# Patient Record
Sex: FEMALE | NOT HISPANIC OR LATINO | Employment: UNEMPLOYED | URBAN - METROPOLITAN AREA
[De-identification: names, ages, dates, MRNs, and addresses within clinical notes are randomized per-mention and may not be internally consistent; named-entity substitution may affect disease eponyms.]

---

## 2017-07-12 ENCOUNTER — NURSE TRIAGE (OUTPATIENT)
Dept: NURSING | Facility: CLINIC | Age: 47
End: 2017-07-12

## 2017-07-12 NOTE — TELEPHONE ENCOUNTER
"  Reason for Disposition    [1] SEVERE pain (e.g., excruciating) AND [2] present > 1 hour    Additional Information    Negative: Shock suspected (e.g., cold/pale/clammy skin, too weak to stand, low BP, rapid pulse)    Negative: Difficult to awaken or acting confused  (e.g., disoriented, slurred speech)    Negative: Passed out (i.e., lost consciousness, collapsed and was not responding)    Negative: Sounds like a life-threatening emergency to the triager    Negative: Chest pain    Pain is mainly in upper abdomen  (if needed ask: \"is it mainly above the belly button?\")    Negative: Severe difficulty breathing (e.g., struggling for each breath, speaks in single words)    Negative: Shock suspected (e.g., cold/pale/clammy skin, too weak to stand, low BP, rapid pulse)    Negative: Difficult to awaken or acting confused  (e.g., disoriented, slurred speech)    Negative: Passed out (i.e., lost consciousness, collapsed and was not responding)    Negative: Visible sweat on face or sweat dripping down face    Negative: Sounds like a life-threatening emergency to the triager    Negative: Followed an abdomen (stomach) injury    Negative: Chest pain    Protocols used: ABDOMINAL PAIN - UPPER-ADULT-, ABDOMINAL PAIN - FEMALE-ADULT-  friend translating/ has upper abdominal pain \"8-10\" for over an hour/ hurts every time she eats/ was seen in [place] and placed on an antibiotic/she took one tablet on 7/10/2017 and later took some mucinex./ has been doing vomiting and had diarrhea/no vomiting today/has only taken one tablet of the antibiotic/ states had X-ray of her stomach in [place] and everything was fine/friend will drive her to the er  Josse Rodriges RN -[phone]  "

## 2018-05-09 ENCOUNTER — THERAPY VISIT (OUTPATIENT)
Dept: PHYSICAL THERAPY | Facility: CLINIC | Age: 48
End: 2018-05-09
Payer: COMMERCIAL

## 2018-05-09 DIAGNOSIS — M25.512 PAIN IN JOINT OF LEFT SHOULDER: ICD-10-CM

## 2018-05-09 DIAGNOSIS — M54.2 CERVICAL SPINE PAIN: Primary | ICD-10-CM

## 2018-05-09 DIAGNOSIS — M54.12 CERVICAL RADICULOPATHY: ICD-10-CM

## 2018-05-09 PROCEDURE — 97535 SELF CARE MNGMENT TRAINING: CPT | Mod: GP | Performed by: PHYSICAL THERAPIST

## 2018-05-09 PROCEDURE — 97162 PT EVAL MOD COMPLEX 30 MIN: CPT | Mod: GP | Performed by: PHYSICAL THERAPIST

## 2018-05-09 NOTE — PROGRESS NOTES
[facility] Initial Evaluation  Subjective:  HPI                    Objective:    Observation:  Patient avoids turning head to L, holds L UE in guarded position (against side, elbow flexed and held against abdomen)   Kyphotic, head forward. She moves in a very guarded manner. Per report she didn't take medication today prior to attending.  System              Cervical/Thoracic Evaluation    AROM:  AROM Cervical:    Flexion:            20%   Extension:       10%  Rotation:         Left: 5%     Right: 20%  Side Bend:      Left: 5%     Right:  15%      Headaches: none  Cervical Myotomes:  Cervical myotomes: R strength testing with some reproduction of L cervical spine/other pain.  L UE testing:  Unable to resist more than slightly due to pain reproduction.      C4 (shrug):  Left: 4    Right: 4  C5 (Deltoid):  Right: 4-  C6 (Biceps):  Right: 4-  C7 (Triceps):  Right: 4-  C8 (Thumb Ext): Left: 5    Right: 5  T1 (Intrinsics): Left: 5    Right: 5  DTR's:  not assessed          Cervical Dermatomes:  not assessed                    Cervical Palpation:  : Hypertonic throughout cervical spine, upper trapezius, rhomboids, L subscapularis,                                                  Positive Spurling's  L   R shoulder motion functional.  L shoulder AROM: Severe shoulder, neck, UT pain attempting to lift arm above chest level, attempting to reach behind back, lift out to side above 45 degrees.  Passive motion not assessed today.  General     ROS    Assessment/Plan:    Patient is a 48 year old female with cervical spine complaints.    Clinical assessment:   Ms. Robison presents with significant cervical spine dysfunction with severe disruption of active/passive movement neck, shoulder, severe radicular pain on L, likely mid cervical spine level.  She also appears to have L shoulder issue, with limited painful passive motion.  Will initiate treatment based upon her tolerance.  She will be further evaluated  this week by neurology.     Patient has the following significant findings with corresponding treatment plan.                Diagnosis 1:  Shoulder pain, cervical spine pain with radicular symptoms L UE  Pain -  hot/cold therapy, manual therapy, self management, education, home program and possibly cervical traction when symptoms subside somewhat  Decreased ROM/flexibility - manual therapy, therapeutic exercise and home program  Decreased strength - therapeutic exercise, therapeutic activities and home program  Inflammation - cold therapy and self management/home program  Impaired muscle performance - neuro re-education and home program  Decreased function - therapeutic activities  Impaired posture - neuro re-education and home program    Therapy Evaluation Codes:   1) History comprised of:   Personal factors that impact the plan of care:      None.    Comorbidity factors that impact the plan of care are:      Overweight.     Medications impacting care: gabapentin.  2) Examination of Body Systems comprised of:   Body structures and functions that impact the plan of care:      Cervical spine, Shoulder and Thoracic Spine.   Activity limitations that impact the plan of care are:      Bathing, Bending, Cooking, Dressing, Grasping, Lifting, Reading/Computer work, Working and Sleeping.  3) Clinical presentation characteristics are:   Evolving/Changing.  4) Decision-Making    Moderate complexity using standardized patient assessment instrument and/or measureable assessment of functional outcome.  Cumulative Therapy Evaluation is: Moderate complexity.    Previous and current functional limitations:  (See Goal Flow Sheet for this information)    Short term and Long term goals: (See Goal Flow Sheet for this information)     Communication ability:  Patient has an  for communication clarity.  Treatment Explanation - The following has been discussed with the patient:   RX ordered/plan of care  Anticipated  outcomes  Possible risks and side effects  This patient would benefit from PT intervention to resume normal activities.   Rehab potential is fair at this time given significant symptoms and very limited tolerance to movement.  She will be seen by neurology this week.    Frequency:  1-2 times per week depending upon tolerance, progress  Duration:  for 4-6 weeks  Discharge Plan:  Achieve all LTG.  Independent in home treatment program.  Reach maximal therapeutic benefit.    Please refer to the daily flowsheet for treatment today, total treatment time and time spent performing 1:1 timed codes.     Physical Therapy Initial Examination/Evaluation  May 9, 2018    Eve Robison is a 48 year old female referred to physical therapy by Stephanie De La Rosa MD for treatment of L rotator cuff syndrome with Precautions/Restrictions/MD instructions to evaluate and treat      Subjective:  DOI/onset: 4-7-18 DOS: none  Acute Injury or Gradual Onset?: unsure but thinks it happened in one day Mechanism of Injury: unknown  Related PMH: has happened before, does report having shrapnel in the upper back Previous Treatment: ER, did have PT Effect of prior treatment: Helpful  Imaging: x-ray, not in record, she is unsure of results Chief Complaint/Functional Limitations:   It is hard to lift her L arm up, lifting arm, showering, all are hard, it is hard to turn her head at all, sleeping is very difficult, and see below in therapy evaluation codes   Pain: rest can't sleep due to pain /10, activity 8 to 10/10 Location: Axillary, upper trap area, L cervical Frequency: Constant Described as: sharp Alleviated by: gabapentin helps, cold helps somewhat Progression of Symptoms: Gradually getting worse, significantly worse than when saw referring MD Time of day when pain is worse: Morning and Evening  Sleeping: Interrupted due to current issue   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

## 2018-05-09 NOTE — MR AVS SNAPSHOT
After Visit Summary   5/9/2018    Eve Robison    MRN: [id number]           Patient Information     Date Of Birth          1970        Visit Information        Provider Department      5/9/2018 7:40 AM Ashlyn Israel, [facility]        Today's Diagnoses     Cervical spine pain    -  1    Cervical radiculopathy        Pain in joint of left shoulder           Follow-ups after your visit        Your next 10 appointments already scheduled     May 10, 2018  7:45 AM CDT   WILLIS Extremity with Ashlyn Israel PT   [facility] ([facility])    [address] [place] [address]   [phone]            May 16, 2018  7:40 AM CDT   WILLIS Extremity with Ashlyn Israel PT   [facility] ([facility])    [facility] [address] [phone]            May 17, 2018 12:10 PM CDT   WILLIS Extremity with Ashlyn Israel PT   [facility] ([facility])    [facility] [address]   [phone]              Who to contact     If you have questions or need follow up information about today's clinic visit or your schedule please contact [facility] [email or web address] [facility] directly at [phone].  Normal or non-critical lab and imaging results will be communicated to you by MyChart, letter or phone within 4 business days after the clinic has received the results. If you do not hear from us within 7 days, please contact the clinic through MyChart or phone. If you have a critical or abnormal lab result, we will notify you by phone as soon as possible.  Submit refill requests through [employer] or call your pharmacy and they will forward the refill request to us. Please allow 3 business days for your refill to be completed.          Additional Information About Your  "Visit        [employer] Information     [employer] lets you send messages to your doctor, view your test results, renew your prescriptions, schedule appointments and more. To sign up, go to [email or web address] . Click on \"Log in\" on the left side of the screen, which will take you to the Welcome page. Then click on \"Sign up Now\" on the right side of the page.     You will be asked to enter the access code listed below, as well as some personal information. Please follow the directions to create your username and password.     Your access code is: [email or web address]  Expires: 2018  5:13 PM     Your access code will  in 90 days. If you need help or a new code, please call your [place] clinic or [phone].        Care EveryWhere ID     This is your Care EveryWhere ID. This could be used by other organizations to access your [place] medical records  [id number]         Blood Pressure from Last 3 Encounters:   06/24/15 108/54   14 106/50   13 107/54    Weight from Last 3 Encounters:   06/24/15 88.5 kg (195 lb)   14 89.4 kg (197 lb)   13 88.5 kg (195 lb)              We Performed the Following     WILLIS Inital Eval Report     PT Eval, Moderate Complexity (38670)     Self Care Management Training        Primary Care Provider Fax #    [facility] [phone]       [address] [place] [address]        Equal Access to Services     OLENA WHITMORE : Hadii aad ku hadasho Soomaali, waaxda luqadaha, qaybta kaalmada adeegyada, cecilio polo . So [facility] [phone].    ATENCIÓN: Si habla español, tiene a cali disposición servicios gratuitos de asistencia lingüística. Llame al [phone].    We comply with applicable federal civil rights laws and [place] laws. We do not discriminate on the basis of race, color, national origin, age, disability, sex, sexual orientation, or gender identity.            Thank you!     Thank you for choosing [facility] [place]  for your care. Our goal is always to provide you with excellent care. Hearing back from our patients is one way we can continue to improve our services. Please take a few minutes to complete the written survey that you may receive in the mail after your visit with us. Thank you!             Your Updated Medication List - Protect others around you: Learn how to safely use, store and throw away your medicines at www.disposemymeds.org.          This list is accurate as of 5/9/18  5:13 PM.  Always use your most recent med list.                   Brand Name Dispense Instructions for use Diagnosis    cyclobenzaprine 10 MG tablet    FLEXERIL    10 tablet    Take 1 tablet by mouth 3 times daily as needed for muscle spasms for 10 days.        gabapentin 300 MG capsule    NEURONTIN    90 capsule    Take 1 capsule by mouth 3 times daily.        ibuprofen 600 MG tablet    ADVIL/MOTRIN    20 tablet    Take 1 tablet (600 mg) by mouth every 8 hours as needed for pain        OMEPRAZOLE PO      Take 20 mg by mouth every morning.        oxyCODONE-acetaminophen 5-325 MG per tablet    PERCOCET    20 tablet    Take 1-2 tablets by mouth every 8 hours as needed for pain.        VITAMIN D (CHOLECALCIFEROL) PO      Take 2,000 Units by mouth daily.

## 2018-05-10 ENCOUNTER — THERAPY VISIT (OUTPATIENT)
Dept: PHYSICAL THERAPY | Facility: CLINIC | Age: 48
End: 2018-05-10
Payer: COMMERCIAL

## 2018-05-10 DIAGNOSIS — M54.12 CERVICAL RADICULOPATHY: ICD-10-CM

## 2018-05-10 DIAGNOSIS — M54.2 CERVICAL SPINE PAIN: ICD-10-CM

## 2018-05-10 DIAGNOSIS — M25.512 PAIN IN JOINT OF LEFT SHOULDER: ICD-10-CM

## 2018-05-10 PROCEDURE — 97140 MANUAL THERAPY 1/> REGIONS: CPT | Mod: GP | Performed by: PHYSICAL THERAPIST

## 2018-05-10 PROCEDURE — 97530 THERAPEUTIC ACTIVITIES: CPT | Mod: GP | Performed by: PHYSICAL THERAPIST

## 2018-05-16 ENCOUNTER — THERAPY VISIT (OUTPATIENT)
Dept: PHYSICAL THERAPY | Facility: CLINIC | Age: 48
End: 2018-05-16
Payer: COMMERCIAL

## 2018-05-16 DIAGNOSIS — M54.12 CERVICAL RADICULOPATHY: ICD-10-CM

## 2018-05-16 DIAGNOSIS — M25.512 PAIN IN JOINT OF LEFT SHOULDER: ICD-10-CM

## 2018-05-16 DIAGNOSIS — M54.2 CERVICAL SPINE PAIN: ICD-10-CM

## 2018-05-16 PROCEDURE — 97140 MANUAL THERAPY 1/> REGIONS: CPT | Mod: GP | Performed by: PHYSICAL THERAPIST

## 2018-05-16 PROCEDURE — 97110 THERAPEUTIC EXERCISES: CPT | Mod: GP | Performed by: PHYSICAL THERAPIST

## 2018-08-23 ENCOUNTER — OFFICE VISIT (OUTPATIENT)
Dept: OBGYN | Facility: CLINIC | Age: 48
End: 2018-08-23
Attending: NURSE PRACTITIONER
Payer: COMMERCIAL

## 2018-08-23 VITALS
WEIGHT: 194.5 LBS | SYSTOLIC BLOOD PRESSURE: 104 MMHG | BODY MASS INDEX: 33.2 KG/M2 | HEIGHT: 64 IN | HEART RATE: 58 BPM | DIASTOLIC BLOOD PRESSURE: 70 MMHG

## 2018-08-23 DIAGNOSIS — Z01.419 ENCOUNTER FOR GYNECOLOGICAL EXAMINATION WITHOUT ABNORMAL FINDING: ICD-10-CM

## 2018-08-23 DIAGNOSIS — N95.0 POST-MENOPAUSE BLEEDING: Primary | ICD-10-CM

## 2018-08-23 DIAGNOSIS — Z12.4 SCREENING FOR MALIGNANT NEOPLASM OF CERVIX: ICD-10-CM

## 2018-08-23 PROCEDURE — G0463 HOSPITAL OUTPT CLINIC VISIT: HCPCS | Mod: 25,ZF

## 2018-08-23 PROCEDURE — 87624 HPV HI-RISK TYP POOLED RSLT: CPT | Performed by: NURSE PRACTITIONER

## 2018-08-23 PROCEDURE — G0476 HPV COMBO ASSAY CA SCREEN: HCPCS | Performed by: NURSE PRACTITIONER

## 2018-08-23 PROCEDURE — G0145 SCR C/V CYTO,THINLAYER,RESCR: HCPCS | Performed by: NURSE PRACTITIONER

## 2018-08-23 ASSESSMENT — PAIN SCALES - GENERAL: PAINLEVEL: SEVERE PAIN (6)

## 2018-08-23 NOTE — PROGRESS NOTES
"SUBJECTIVE:   Eve Robison is a 48 yr old [place] female, , with an actual age 64 who presents to clinic today with post-menopausal bleeding.  Patient declined a professional ; she was accompanied to the visit by her daughter who she requested interpret for this visit.     Eve went through menopause 7 yrs ago. 5 days ago, Eve noticed the onset of light vaginal bleeding; primarily notices the blood when she wipes after voiding, but also has noted some blood on her pad.  Endorses feeling some generalized pelvic pain and bloating for the last 5 days. She is not sexually active. Denies vaginal dryness, itching, and malodor.     Eve is being treated for urinary urgency by her PCP; she denies dysuria and urinary frequency as well as constipation, fever, and diarrhea. She has never been on hormone therapy.     Last pap smear: ~7 yrs ago    Current Outpatient Prescriptions   Medication     Cholecalciferol (VITAMIN D3 PO)     OXYBUTYNIN CHLORIDE PO     No current facility-administered medications for this visit.      Past Medical History:   Diagnosis Date     Left shoulder pain      Past Surgical History:   Procedure Laterality Date     APPENDECTOMY       Family History   Problem Relation Age of Onset     Blood Disease Mother      Leukemia     Hypertension Mother      Blood Disease Daughter      Leukemia     OBJECTIVE:  /70  Pulse 58  Ht 1.626 m (5' 4\")  Wt 88.2 kg (194 lb 8 oz)  BMI 33.39 kg/m2  General: pleasant female in no acute distress  Abdomen: soft, non-tender; no CVA tenderness  Pelvic Exam:  Vulva: no external lesions, normal hair distribution, no adenopathy  Vagina: estrogen-loss atrophy present; thin; pale; no abnormal discharge, minimal rugae, no lesions  Cervix: parous, smooth, pale, no visible lesions; no evidence of a polyp  Uterus: Normal size, non-tender, mobile  Ovaries: not palpable  Rectal exam: normal anus    ASSESSMENT:  Post-menopause bleeding  (primary encounter " diagnosis)  Screening for malignant neoplasm of cervix  Encounter for gynecological examination without abnormal finding      PLAN:   Pap smear done today  No evidence of the etiology of her vaginal bleeding noted on exam.  Patient to schedule pelvic ultrasound to evaluate her uterus. Counseled patient that findings of ultrasound will dictate next steps in evaluation which may include an endometrial biopsy. She was instructed to schedule a follow-up appointment directly following her ultrasound.  Patient expressed understanding and agreement with the plan for care.    Lisa Ruelas, DNP, APRN, WHNP

## 2018-08-23 NOTE — LETTER
"2018       RE: Eve Robison  [address] [address]  [facility] [address]     Dear Colleague,    Thank you for referring your patient, Eve Robison, to the [facility]. Please see a copy of my visit note below.    SUBJECTIVE:   Eve Robison is a 48 yr old [place] female, , with an actual age 64 who presents to clinic today with post-menopausal bleeding.  Patient declined a professional ; she was accompanied to the visit by her daughter who she requested interpret for this visit.     Eve went through menopause 7 yrs ago. 5 days ago, Eve noticed the onset of light vaginal bleeding; primarily notices the blood when she wipes after voiding, but also has noted some blood on her pad.  Endorses feeling some generalized pelvic pain and bloating for the last 5 days. She is not sexually active. Denies vaginal dryness, itching, and malodor.     Eve is being treated for urinary urgency by her PCP; she denies dysuria and urinary frequency as well as constipation, fever, and diarrhea. She has never been on hormone therapy.     Last pap smear: ~7 yrs ago    Current Outpatient Prescriptions   Medication     Cholecalciferol (VITAMIN D3 PO)     OXYBUTYNIN CHLORIDE PO     No current facility-administered medications for this visit.      Past Medical History:   Diagnosis Date     Left shoulder pain      Past Surgical History:   Procedure Laterality Date     APPENDECTOMY       Family History   Problem Relation Age of Onset     Blood Disease Mother      Leukemia     Hypertension Mother      Blood Disease Daughter      Leukemia     OBJECTIVE:  /70  Pulse 58  Ht 1.626 m (5' 4\")  Wt 88.2 kg (194 lb 8 oz)  BMI 33.39 kg/m2  General: pleasant female in no acute distress  Abdomen: soft, non-tender; no CVA tenderness  Pelvic Exam:  Vulva: no external lesions, normal hair distribution, no adenopathy  Vagina: estrogen-loss atrophy present; thin; " pale; no abnormal discharge, minimal rugae, no lesions  Cervix: parous, smooth, pale, no visible lesions; no evidence of a polyp  Uterus: Normal size, non-tender, mobile  Ovaries: not palpable  Rectal exam: normal anus    ASSESSMENT:  Post-menopause bleeding  (primary encounter diagnosis)  Screening for malignant neoplasm of cervix  Encounter for gynecological examination without abnormal finding      PLAN:   Pap smear done today  No evidence of the etiology of her vaginal bleeding noted on exam.  Patient to schedule pelvic ultrasound to evaluate her uterus. Counseled patient that findings of ultrasound will dictate next steps in evaluation which may include an endometrial biopsy. She was instructed to schedule a follow-up appointment directly following her ultrasound.  Patient expressed understanding and agreement with the plan for care.    Lisa Ruelas, ALEX, APRN, WHNP

## 2018-08-23 NOTE — MR AVS SNAPSHOT
After Visit Summary   2018    Eve Robison    MRN: [id number]           Patient Information     Date Of Birth          1970        Visit Information        Provider Department      2018 1:30 PM Lisa Ruelas APRN [facility]        Today's Diagnoses     Post-menopause bleeding    -  1    Screening for malignant neoplasm of cervix        Encounter for gynecological examination without abnormal finding           Follow-ups after your visit        Your next 10 appointments already scheduled     Sep 12, 2018  3:45 PM CDT   Return Visit with Annette Garcia MD   [facility] ([facility])    [place] [facility] [address]  [address]  [facility] [address] [phone]              Who to contact     Please call your clinic at [phone] to:    Ask questions about your health    Make or cancel appointments    Discuss your medicines    Learn about your test results    Speak to your doctor            Additional Information About Your Visit        MyChart Information     [employer] is an electronic gateway that provides easy, online access to your medical records. With [employer], you can request a clinic appointment, read your test results, renew a prescription or communicate with your care team.     To sign up for "[employer]t visit the website at [email or web address]   You will be asked to enter the access code listed below, as well as some personal information. Please follow the directions to create your username and password.     Your access code is: [email or web address]  Expires: 2018  6:30 AM     Your access code will  in 90 days. If you need help or a new code, please contact your [facility] or call [phone] for assistance.        Care EveryWhere ID     This is your Care EveryWhere ID. This could be used by other organizations to access your [facility]  "records  [id number]        Your Vitals Were     Pulse Height BMI (Body Mass Index)             58 1.626 m (5' 4\") 33.39 kg/m2          Blood Pressure from Last 3 Encounters:   08/24/18 103/63   08/23/18 104/70    Weight from Last 3 Encounters:   08/24/18 88 kg (194 lb)   08/23/18 88.2 kg (194 lb 8 oz)              We Performed the Following     HPV High Risk Types DNA Cervical     Pap imaged thin layer screen with HPV - recommended age 30 - 65 years (select HPV order below)     Pap Smear Exam [] Do Not Remove     Pelvic and Breast Exam Procedure []        Primary Care Provider Office Phone # Fax #    Nivia J [facility] [phone]       [place] [address] [facility] [address]        Equal Access to Services     OLENA WHITMORE : Hadii aad ku hadasho Soshelley, waaxda luqadaha, qaybta kaalmada adesteffenyabakari, cecilio polo . So [facility] [phone].    ATENCIÓN: Si habla español, tiene a cali disposición servicios gratuitos de asistencia lingüística. Ambar al [phone].    We comply with applicable federal civil rights laws and [place] laws. We do not discriminate on the basis of race, color, national origin, age, disability, sex, sexual orientation, or gender identity.            Thank you!     Thank you for choosing [facility]  for your care. Our goal is always to provide you with excellent care. Hearing back from our patients is one way we can continue to improve our services. Please take a few minutes to complete the written survey that you may receive in the mail after your visit with us. Thank you!             Your Updated Medication List - Protect others around you: Learn how to safely use, store and throw away your medicines at www.disposemymeds.org.          This list is accurate as of 8/23/18 11:59 PM.  Always use your most recent med list.                   Brand Name Dispense Instructions for use Diagnosis    OXYBUTYNIN " CHLORIDE PO           VITAMIN D3 PO      Take by mouth daily

## 2018-08-23 NOTE — NURSING NOTE
Chief Complaint   Patient presents with     Abnormal Uterine Bleeding     PT c/o vaginal bleeding for 5 days.  Pt's menses stopped 7 yrs ago.

## 2018-08-24 ENCOUNTER — RADIANT APPOINTMENT (OUTPATIENT)
Dept: ULTRASOUND IMAGING | Facility: CLINIC | Age: 48
End: 2018-08-24
Attending: NURSE PRACTITIONER
Payer: COMMERCIAL

## 2018-08-24 ENCOUNTER — OFFICE VISIT (OUTPATIENT)
Dept: OBGYN | Facility: CLINIC | Age: 48
End: 2018-08-24
Attending: NURSE PRACTITIONER
Payer: COMMERCIAL

## 2018-08-24 VITALS
HEART RATE: 71 BPM | HEIGHT: 64 IN | WEIGHT: 194 LBS | SYSTOLIC BLOOD PRESSURE: 103 MMHG | BODY MASS INDEX: 33.12 KG/M2 | DIASTOLIC BLOOD PRESSURE: 63 MMHG

## 2018-08-24 DIAGNOSIS — N95.0 POST-MENOPAUSE BLEEDING: Primary | ICD-10-CM

## 2018-08-24 DIAGNOSIS — N95.0 POST-MENOPAUSE BLEEDING: ICD-10-CM

## 2018-08-24 PROCEDURE — 76856 US EXAM PELVIC COMPLETE: CPT

## 2018-08-24 NOTE — MR AVS SNAPSHOT
"              After Visit Summary   2018    Eve Robison    MRN: [id number]           Patient Information     Date Of Birth          1970        Visit Information        Provider Department      2018 9:00 AM Lisa Ruelas APRN [facility]        Today's Diagnoses     Post-menopause bleeding    -  1       Follow-ups after your visit        Your next 10 appointments already scheduled     Sep 12, 2018  3:45 PM CDT   Return Visit with Annette Garcia MD   [facility] ([facility])    [facility] [address]  [address]  [facility] [address] [phone]              Who to contact     Please call your clinic at [phone] to:    Ask questions about your health    Make or cancel appointments    Discuss your medicines    Learn about your test results    Speak to your doctor            Additional Information About Your Visit        MyChart Information     ffk environmentt is an electronic gateway that provides easy, online access to your medical records. With [email or web address], you can request a clinic appointment, read your test results, renew a prescription or communicate with your care team.     To sign up for ffk environmentt visit the website at [email or web address]   You will be asked to enter the access code listed below, as well as some personal information. Please follow the directions to create your username and password.     Your access code is: [email or web address]  Expires: 2018  6:30 AM     Your access code will  in 90 days. If you need help or a new code, please contact your [facility] or call [phone] for assistance.        Care EveryWhere ID     This is your Care EveryWhere ID. This could be used by other organizations to access your [place] medical records  [id number]        Your Vitals Were     Pulse Height BMI (Body Mass Index)             71 1.626 m (5' 4\") 33.3 " kg/m2          Blood Pressure from Last 3 Encounters:   08/24/18 103/63   08/23/18 104/70    Weight from Last 3 Encounters:   08/24/18 88 kg (194 lb)   08/23/18 88.2 kg (194 lb 8 oz)              Today, you had the following     No orders found for display       Primary Care Provider Office Phone # Fax #    Nivia J Vasquez [phone]       [address] [address]        Equal Access to Services     OLENA WHITMORE : Hadii aad ku hadasho Soomaali, waaxda luqadaha, qaybta kaalmada adeegyada, waxay idiin hayaan adesteffen polo . So [facility] [phone].    ATENCIÓN: Si habla español, tiene a cali disposición servicios gratuitos de asistencia lingüística. Ambar al [phone].    We comply with applicable federal civil rights laws and [place] laws. We do not discriminate on the basis of race, color, national origin, age, disability, sex, sexual orientation, or gender identity.            Thank you!     Thank you for choosing [facility]  for your care. Our goal is always to provide you with excellent care. Hearing back from our patients is one way we can continue to improve our services. Please take a few minutes to complete the written survey that you may receive in the mail after your visit with us. Thank you!             Your Updated Medication List - Protect others around you: Learn how to safely use, store and throw away your medicines at www.disposemymeds.org.          This list is accurate as of 8/24/18 11:59 PM.  Always use your most recent med list.                   Brand Name Dispense Instructions for use Diagnosis    OXYBUTYNIN CHLORIDE PO           VITAMIN D3 PO      Take by mouth daily

## 2018-08-24 NOTE — LETTER
"8/24/2018       RE: Eve Robison  [address] [address]  [facility] [address]     Dear Colleague,    Thank you for referring your patient, Eve Robison, to the [facility] at [facility]. Please see a copy of my visit note below.    SUBJECTIVE:  Eve Robison presents to clinic today for follow-up on her ultrasound done for post-menopausal bleeding.  Eve declined a professional . She was accompanied to the visit by her daughter who acted as an .     Endometrium was 2.9mm; possible endometrial polyp was noted.  Bleeding has slowed. No longer noting any on her pad; notes small amount when she wipes.    Pap smear had been done at last visit. No new symptoms reported today.      OBJECTIVE:  /63  Pulse 71  Ht 1.626 m (5' 4\")  Wt 88 kg (194 lb)  BMI 33.3 kg/m2  General: pleasant female in no acute distress  Psych: normal mentation    ASSESSMENT:  Post-menopause bleeding  (primary encounter diagnosis)    PLAN:  Reviewed ultrasound results with patient. Provided patient with reassurance that the lining of her uterus is very thin.  Offered SIS vs hysteroscopy as next steps in evaluation and management of her bleeding. Counseled patient on each of these procedures, benefits, risks, and how they are done.  Eve desires to have a SIS.  Patient to schedule this procedure at the .  Pap smear result is still pending.   She left stable; expressed understanding and agreement with the plan for care.    Lisa Ruelas, DNP, APRN, WHNP      "

## 2018-08-24 NOTE — PROGRESS NOTES
"SUBJECTIVE:  Eve Robison presents to clinic today for follow-up on her ultrasound done for post-menopausal bleeding.  Eve declined a professional . She was accompanied to the visit by her daughter who acted as an .     Endometrium was 2.9mm; possible endometrial polyp was noted.  Bleeding has slowed. No longer noting any on her pad; notes small amount when she wipes.    Pap smear had been done at last visit. No new symptoms reported today.      OBJECTIVE:  /63  Pulse 71  Ht 1.626 m (5' 4\")  Wt 88 kg (194 lb)  BMI 33.3 kg/m2  General: pleasant female in no acute distress  Psych: normal mentation    ASSESSMENT:  Post-menopause bleeding  (primary encounter diagnosis)    PLAN:  Reviewed ultrasound results with patient. Provided patient with reassurance that the lining of her uterus is very thin.  Offered SIS vs hysteroscopy as next steps in evaluation and management of her bleeding. Counseled patient on each of these procedures, benefits, risks, and how they are done.  Eve desires to have a SIS.  Patient to schedule this procedure at the .  Pap smear result is still pending.   She left stable; expressed understanding and agreement with the plan for care.    Lisa Ruelas, DNP, APRN, WHNP    "

## 2018-08-28 LAB
COPATH REPORT: ABNORMAL
PAP: ABNORMAL

## 2018-08-30 LAB
FINAL DIAGNOSIS: NORMAL
HPV HR 12 DNA CVX QL NAA+PROBE: NEGATIVE
HPV16 DNA SPEC QL NAA+PROBE: NEGATIVE
HPV18 DNA SPEC QL NAA+PROBE: NEGATIVE
SPECIMEN DESCRIPTION: NORMAL
SPECIMEN SOURCE CVX/VAG CYTO: NORMAL

## 2018-09-06 ENCOUNTER — TELEPHONE (OUTPATIENT)
Dept: OBGYN | Facility: CLINIC | Age: 48
End: 2018-09-06

## 2018-09-06 DIAGNOSIS — R87.610 PAPANICOLAOU SMEAR OF CERVIX WITH ATYPICAL SQUAMOUS CELLS OF UNDETERMINED SIGNIFICANCE (ASC-US): ICD-10-CM

## 2018-09-12 ENCOUNTER — OFFICE VISIT (OUTPATIENT)
Dept: OBGYN | Facility: CLINIC | Age: 48
End: 2018-09-12
Payer: COMMERCIAL

## 2018-09-12 ENCOUNTER — RADIANT APPOINTMENT (OUTPATIENT)
Dept: ULTRASOUND IMAGING | Facility: CLINIC | Age: 48
End: 2018-09-12
Payer: COMMERCIAL

## 2018-09-12 VITALS
HEIGHT: 64 IN | BODY MASS INDEX: 33.72 KG/M2 | DIASTOLIC BLOOD PRESSURE: 64 MMHG | SYSTOLIC BLOOD PRESSURE: 98 MMHG | HEART RATE: 66 BPM | WEIGHT: 197.5 LBS

## 2018-09-12 DIAGNOSIS — N95.0 POST-MENOPAUSAL BLEEDING: ICD-10-CM

## 2018-09-12 DIAGNOSIS — N95.0 POSTMENOPAUSAL BLEEDING: Primary | ICD-10-CM

## 2018-09-12 PROCEDURE — 58340 CATHETER FOR HYSTEROGRAPHY: CPT

## 2018-09-12 PROCEDURE — 58340 CATHETER FOR HYSTEROGRAPHY: CPT | Mod: ZF | Performed by: INTERNAL MEDICINE

## 2018-09-12 NOTE — PATIENT INSTRUCTIONS
Return to clinic with any vaginal bleeding, questions, or concerns. Continue routine health maintenance

## 2018-09-12 NOTE — MR AVS SNAPSHOT
"              After Visit Summary   2018    Eve Robison    MRN: [id number]           Patient Information     Date Of Birth          1970        Visit Information        Provider Department      2018 2:00 PM Annette Garcia MD [facility]        Today's Diagnoses     Postmenopausal bleeding    -  1      Care Instructions    Return to clinic with any vaginal bleeding, questions, or concerns. Continue routine health maintenance          Follow-ups after your visit        Follow-up notes from your care team     Return if symptoms worsen or fail to improve.      Who to contact     Please call your clinic at [phone] to:    Ask questions about your health    Make or cancel appointments    Discuss your medicines    Learn about your test results    Speak to your doctor            Additional Information About Your Visit        MyChart Information     SNSplust is an electronic gateway that provides easy, online access to your medical records. With [employer], you can request a clinic appointment, read your test results, renew a prescription or communicate with your care team.     To sign up for SNSplust visit the website at [email or web address]   You will be asked to enter the access code listed below, as well as some personal information. Please follow the directions to create your username and password.     Your access code is: [email or web address]  Expires: 2018  6:30 AM     Your access code will  in 90 days. If you need help or a new code, please contact your [facility] or call [phone] for assistance.        Care EveryWhere ID     This is your Care EveryWhere ID. This could be used by other organizations to access your [place] medical records  [id number]        Your Vitals Were     Pulse Height BMI (Body Mass Index)             66 1.626 m (5' 4\") 33.9 kg/m2          Blood Pressure from Last 3 Encounters:   18 98/64   18 " 103/63   08/23/18 104/70    Weight from Last 3 Encounters:   09/12/18 89.6 kg (197 lb 8 oz)   08/24/18 88 kg (194 lb)   08/23/18 88.2 kg (194 lb 8 oz)              We Performed the Following     Hysterogr/SIS Contrast/Saline Injection (In-Clinic Procedure performed in [place])        Primary Care Provider Office Phone # Fax #    Nivia J [facility] [phone]       [address] [address]        Equal Access to Services     OLENA WHITMORE : Hadii aad ku hadasho Soshelley, waaxda luqadaha, qaybta kaalmada adesteffenyabakari, cecilio polo . So [facility] [phone].    ATENCIÓN: Si habla español, tiene a cali disposición servicios gratuitos de asistencia lingüística. [facility] [phone].    We comply with applicable federal civil rights laws and [place] laws. We do not discriminate on the basis of race, color, national origin, age, disability, sex, sexual orientation, or gender identity.            Thank you!     Thank you for choosing [facility]  for your care. Our goal is always to provide you with excellent care. Hearing back from our patients is one way we can continue to improve our services. Please take a few minutes to complete the written survey that you may receive in the mail after your visit with us. Thank you!             Your Updated Medication List - Protect others around you: Learn how to safely use, store and throw away your medicines at www.disposemymeds.org.          This list is accurate as of 9/12/18 11:59 PM.  Always use your most recent med list.                   Brand Name Dispense Instructions for use Diagnosis    IBUPROFEN PO           OXYBUTYNIN CHLORIDE PO           VITAMIN D3 PO      Take by mouth daily

## 2018-09-12 NOTE — PROGRESS NOTES
"[facility]  SIS Procedure Note      HPI:    Eve Robison is a 48 year old P3 who was recently evaluated for postmenopausal bleeding. She had onset of bleeding at the end of August that was mostly bleeding with wiping and a small amount seen on her menstrual pad. This was her first episode of postmenopausal bleeding. She went through menopause 7 years ago. She was seen by Klaudia Ruelas on 8/23/18. TVUS showed a thin endometrial stripe of 2.9 mm and questionable polyp. She was counseled on her options including saline infused sonohysterography or hysteroscopy. Ms. Robison decided to proceed with SIS. Since her visit, the bleeding has stopped. She is feeling well overall.     Patient declined an  and preferred for her daughter to interpret medical information.     ROS: 10-Point ROS negative except as noted in HPI    Physical Exam  BP 98/64  Pulse 66  Ht 1.626 m (5' 4\")  Wt 89.6 kg (197 lb 8 oz)  BMI 33.9 kg/m2  Body mass index is 33.9 kg/(m^2).  Gen: Well-appearing, NAD  Resp: Breathing comfortably on room air  Pelvic: Cervix visualized without lesions. No blood or discharge in the vagina.     SIS: Prior to procedure, informed consent was obtained. Discussed the risks, benefits, and alternatives, including the low risks of bleeding and infection. Also discussed the possibility of not being able to pass the catheter if the cervix is stenotic.    A sterile speculum was placed in the vagina to visualize the cervix. The cervix was cleaned with betadine x3. The saline catheter was easily passed through the cervix into the endometrial cavity. The speculum was removed and the transvaginal probe was inserted by the ultrasonographer. 3 ml of saline were injected into the cavity under ultrasound visualization. No polyps or fibroids were identified. The ultrasound probe and catheter were removed. The patient tolerated the procedure well. Blood loss 0 mL.    Assessment/Plan:  Eve Robison is a 48 year old " P3 here for SIS for further workup of postmenopausal bleeding. Her bleeding has since resolved. Prior US showed a thin endometrial stripe with questionable polyp. SIS performed today did not show evidence of polyp or fibroid. Discussed with patient the most likely cause of her postmenopausal bleeding is endometrial atrophy with a thin stripe. Encouraged patient to call in or return for evaluation with any additional bleeding, fevers, chills, or vaginal discharge. She should continue her routine health maintenance visits.    Annette Garcia MD  OB/GYN, PGY3  09/12/18    I was present for entire procedure. Agree with above note.   Anusha Ruvalcaba MD

## 2018-10-24 ENCOUNTER — MEDICAL CORRESPONDENCE (OUTPATIENT)
Dept: HEALTH INFORMATION MANAGEMENT | Facility: CLINIC | Age: 48
End: 2018-10-24

## 2019-05-07 ENCOUNTER — TRANSFERRED RECORDS (OUTPATIENT)
Dept: HEALTH INFORMATION MANAGEMENT | Facility: CLINIC | Age: 49
End: 2019-05-07

## 2019-05-10 ENCOUNTER — PRE VISIT (OUTPATIENT)
Dept: UROLOGY | Facility: CLINIC | Age: 49
End: 2019-05-10

## 2019-05-10 NOTE — TELEPHONE ENCOUNTER
MEDICAL RECORDS REQUEST   [facility]  Urology Clinic  [address] [place] [address]  PHONE: [phone]  Fax: [phone]        FUTURE VISIT INFORMATION                                                   Eve Robison, : 1970 scheduled for future visit at [facility]    APPOINTMENT INFORMATION:    Date: 19 10AM    Provider:  Terri Tom MD    Reason for Visit/Diagnosis: Incontinence    REFERRAL INFORMATION:    Referring provider: Nivia Ovalle     Specialty: MD    Referring providers clinic:  [facility] contact number:  [phone]    RECORDS REQUESTED FOR VISIT                                                     NOTES  STATUS/DETAILS   OFFICE NOTE from referring provider  yes   OFFICE NOTE from other specialist  no   DISCHARGE SUMMARY from hospital  no   DISCHARGE REPORT from the ER  no   OPERATIVE REPORT  no   MEDICATION LIST  yes   LABS     URINALYSIS (UA)  Yes      PRE-VISIT CHECKLIST      Record collection complete Yes -  [facility] recs scanned in Epic     Appointment appropriately scheduled           (right time/right provider) Yes   MyChart activation If no, please explain: In process   Questionnaire complete If no, please explain: In process     Completed by: Leta Dickens

## 2019-05-14 ENCOUNTER — PRE VISIT (OUTPATIENT)
Dept: UROLOGY | Facility: CLINIC | Age: 49
End: 2019-05-14

## 2019-05-14 NOTE — TELEPHONE ENCOUNTER
Reason for visit: incontinence consult     Relevant information: referred by [facility]    Records/imaging/labs/orders: records available    Pt called: no need for a call    At Rooming: dip/pvr
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
## 2021-02-26 ENCOUNTER — VIRTUAL VISIT (OUTPATIENT)
Dept: RHEUMATOLOGY | Facility: CLINIC | Age: 51
End: 2021-02-26
Attending: PHYSICIAN ASSISTANT
Payer: COMMERCIAL

## 2021-02-26 DIAGNOSIS — M25.579 PAIN IN JOINT INVOLVING ANKLE AND FOOT, UNSPECIFIED LATERALITY: ICD-10-CM

## 2021-02-26 DIAGNOSIS — M25.50 POLYARTHRALGIA: Primary | ICD-10-CM

## 2021-02-26 PROCEDURE — 99203 OFFICE O/P NEW LOW 30 MIN: CPT | Mod: 95 | Performed by: STUDENT IN AN ORGANIZED HEALTH CARE EDUCATION/TRAINING PROGRAM

## 2021-02-26 RX ORDER — DULOXETIN HYDROCHLORIDE 20 MG/1
20 CAPSULE, DELAYED RELEASE ORAL 2 TIMES DAILY
COMMUNITY

## 2021-02-26 NOTE — PATIENT INSTRUCTIONS
-Please make an appointment for lab and x-rays at any [place] location near you  -I will be in touch once all test results are back  -Follow-up on 3/25 for in-person evaluation        Clinic Address:     [facility] [place]     [address] [place] [address] [phone]    Please enter through West entrance and check in at Check in desk D

## 2021-02-26 NOTE — PROGRESS NOTES
Eve is a 51 year old who is being evaluated via a billable telephone visit.      What phone number would you like to be contacted at? [phone]  How would you like to obtain your AVS? Mail a copy  Phone call duration: 45 minutes
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
## 2021-03-05 ENCOUNTER — HOSPITAL ENCOUNTER (OUTPATIENT)
Dept: GENERAL RADIOLOGY | Facility: CLINIC | Age: 51
End: 2021-03-05
Attending: STUDENT IN AN ORGANIZED HEALTH CARE EDUCATION/TRAINING PROGRAM
Payer: COMMERCIAL

## 2021-03-05 DIAGNOSIS — M25.50 POLYARTHRALGIA: ICD-10-CM

## 2021-03-05 PROCEDURE — 73630 X-RAY EXAM OF FOOT: CPT | Mod: 26 | Performed by: RADIOLOGY

## 2021-03-05 PROCEDURE — 72202 X-RAY EXAM SI JOINTS 3/> VWS: CPT

## 2021-03-05 PROCEDURE — 73130 X-RAY EXAM OF HAND: CPT | Mod: 50

## 2021-03-05 PROCEDURE — 72202 X-RAY EXAM SI JOINTS 3/> VWS: CPT | Mod: 26 | Performed by: RADIOLOGY

## 2021-03-05 PROCEDURE — 73630 X-RAY EXAM OF FOOT: CPT | Mod: 50

## 2021-03-05 PROCEDURE — 73130 X-RAY EXAM OF HAND: CPT | Mod: 26 | Performed by: RADIOLOGY
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
## 2021-03-25 ENCOUNTER — ANCILLARY PROCEDURE (OUTPATIENT)
Dept: GENERAL RADIOLOGY | Facility: CLINIC | Age: 51
End: 2021-03-25
Attending: STUDENT IN AN ORGANIZED HEALTH CARE EDUCATION/TRAINING PROGRAM
Payer: COMMERCIAL

## 2021-03-25 ENCOUNTER — OFFICE VISIT (OUTPATIENT)
Dept: RHEUMATOLOGY | Facility: CLINIC | Age: 51
End: 2021-03-25
Payer: COMMERCIAL

## 2021-03-25 VITALS
OXYGEN SATURATION: 97 % | WEIGHT: 195.5 LBS | SYSTOLIC BLOOD PRESSURE: 90 MMHG | HEART RATE: 69 BPM | BODY MASS INDEX: 33.38 KG/M2 | DIASTOLIC BLOOD PRESSURE: 60 MMHG | TEMPERATURE: 98 F | HEIGHT: 64 IN

## 2021-03-25 DIAGNOSIS — M25.50 POLYARTHRALGIA: ICD-10-CM

## 2021-03-25 DIAGNOSIS — M25.50 POLYARTHRALGIA: Primary | ICD-10-CM

## 2021-03-25 LAB
ALBUMIN SERPL-MCNC: 3.5 G/DL (ref 3.4–5)
ALP SERPL-CCNC: 78 U/L (ref 40–150)
ALT SERPL W P-5'-P-CCNC: 20 U/L (ref 0–50)
ANION GAP SERPL CALCULATED.3IONS-SCNC: 3 MMOL/L (ref 3–14)
AST SERPL W P-5'-P-CCNC: 15 U/L (ref 0–45)
BASOPHILS # BLD AUTO: 0 10E9/L (ref 0–0.2)
BASOPHILS NFR BLD AUTO: 0.4 %
BILIRUB SERPL-MCNC: 0.3 MG/DL (ref 0.2–1.3)
BUN SERPL-MCNC: 13 MG/DL (ref 7–30)
CALCIUM SERPL-MCNC: 9.2 MG/DL (ref 8.5–10.1)
CHLORIDE SERPL-SCNC: 106 MMOL/L (ref 94–109)
CO2 SERPL-SCNC: 32 MMOL/L (ref 20–32)
CREAT SERPL-MCNC: 0.9 MG/DL (ref 0.52–1.04)
DIFFERENTIAL METHOD BLD: ABNORMAL
EOSINOPHIL # BLD AUTO: 0.3 10E9/L (ref 0–0.7)
EOSINOPHIL NFR BLD AUTO: 3.4 %
ERYTHROCYTE [DISTWIDTH] IN BLOOD BY AUTOMATED COUNT: 13.6 % (ref 10–15)
GFR SERPL CREATININE-BSD FRML MDRD: 75 ML/MIN/{1.73_M2}
GLUCOSE SERPL-MCNC: 93 MG/DL (ref 70–99)
HCT VFR BLD AUTO: 38.7 % (ref 35–47)
HGB BLD-MCNC: 12 G/DL (ref 11.7–15.7)
IMM GRANULOCYTES # BLD: 0 10E9/L (ref 0–0.4)
IMM GRANULOCYTES NFR BLD: 0.3 %
LYMPHOCYTES # BLD AUTO: 2.8 10E9/L (ref 0.8–5.3)
LYMPHOCYTES NFR BLD AUTO: 36.9 %
MCH RBC QN AUTO: 25.2 PG (ref 26.5–33)
MCHC RBC AUTO-ENTMCNC: 31 G/DL (ref 31.5–36.5)
MCV RBC AUTO: 81 FL (ref 78–100)
MONOCYTES # BLD AUTO: 0.4 10E9/L (ref 0–1.3)
MONOCYTES NFR BLD AUTO: 5.8 %
NEUTROPHILS # BLD AUTO: 4.1 10E9/L (ref 1.6–8.3)
NEUTROPHILS NFR BLD AUTO: 53.2 %
PLATELET # BLD AUTO: 246 10E9/L (ref 150–450)
POTASSIUM SERPL-SCNC: 3.7 MMOL/L (ref 3.4–5.3)
PROT SERPL-MCNC: 7.6 G/DL (ref 6.8–8.8)
RBC # BLD AUTO: 4.76 10E12/L (ref 3.8–5.2)
SODIUM SERPL-SCNC: 141 MMOL/L (ref 133–144)
TSH SERPL DL<=0.005 MIU/L-ACNC: 1.79 MU/L (ref 0.4–4)
WBC # BLD AUTO: 7.6 10E9/L (ref 4–11)

## 2021-03-25 PROCEDURE — 2894A VOIDCORRECT: CPT | Performed by: STUDENT IN AN ORGANIZED HEALTH CARE EDUCATION/TRAINING PROGRAM

## 2021-03-25 PROCEDURE — 73610 X-RAY EXAM OF ANKLE: CPT | Mod: LT | Performed by: RADIOLOGY

## 2021-03-25 PROCEDURE — 86225 DNA ANTIBODY NATIVE: CPT | Performed by: STUDENT IN AN ORGANIZED HEALTH CARE EDUCATION/TRAINING PROGRAM

## 2021-03-25 PROCEDURE — 86880 COOMBS TEST DIRECT: CPT | Performed by: STUDENT IN AN ORGANIZED HEALTH CARE EDUCATION/TRAINING PROGRAM

## 2021-03-25 PROCEDURE — 99N1036 PR STATISTIC TOTAL PROTEIN: Performed by: STUDENT IN AN ORGANIZED HEALTH CARE EDUCATION/TRAINING PROGRAM

## 2021-03-25 PROCEDURE — 86200 CCP ANTIBODY: CPT | Performed by: STUDENT IN AN ORGANIZED HEALTH CARE EDUCATION/TRAINING PROGRAM

## 2021-03-25 PROCEDURE — 73523 X-RAY EXAM HIPS BI 5/> VIEWS: CPT | Performed by: RADIOLOGY

## 2021-03-25 PROCEDURE — 73564 X-RAY EXAM KNEE 4 OR MORE: CPT | Mod: LT | Performed by: RADIOLOGY

## 2021-03-25 PROCEDURE — 86039 ANTINUCLEAR ANTIBODIES (ANA): CPT | Performed by: STUDENT IN AN ORGANIZED HEALTH CARE EDUCATION/TRAINING PROGRAM

## 2021-03-25 PROCEDURE — 86803 HEPATITIS C AB TEST: CPT | Performed by: STUDENT IN AN ORGANIZED HEALTH CARE EDUCATION/TRAINING PROGRAM

## 2021-03-25 PROCEDURE — 36415 COLL VENOUS BLD VENIPUNCTURE: CPT | Performed by: STUDENT IN AN ORGANIZED HEALTH CARE EDUCATION/TRAINING PROGRAM

## 2021-03-25 PROCEDURE — 86235 NUCLEAR ANTIGEN ANTIBODY: CPT | Performed by: STUDENT IN AN ORGANIZED HEALTH CARE EDUCATION/TRAINING PROGRAM

## 2021-03-25 PROCEDURE — 86431 RHEUMATOID FACTOR QUANT: CPT | Performed by: STUDENT IN AN ORGANIZED HEALTH CARE EDUCATION/TRAINING PROGRAM

## 2021-03-25 PROCEDURE — 80050 GENERAL HEALTH PANEL: CPT | Performed by: STUDENT IN AN ORGANIZED HEALTH CARE EDUCATION/TRAINING PROGRAM

## 2021-03-25 PROCEDURE — 86038 ANTINUCLEAR ANTIBODIES: CPT | Performed by: STUDENT IN AN ORGANIZED HEALTH CARE EDUCATION/TRAINING PROGRAM

## 2021-03-25 PROCEDURE — 84165 PROTEIN E-PHORESIS SERUM: CPT | Performed by: PATHOLOGY

## 2021-03-25 PROCEDURE — 99214 OFFICE O/P EST MOD 30 MIN: CPT | Performed by: STUDENT IN AN ORGANIZED HEALTH CARE EDUCATION/TRAINING PROGRAM

## 2021-03-25 ASSESSMENT — PAIN SCALES - GENERAL: PAINLEVEL: MODERATE PAIN (4)

## 2021-03-25 ASSESSMENT — MIFFLIN-ST. JEOR: SCORE: 1496.78

## 2021-03-25 NOTE — PROGRESS NOTES
"Eve Robison's goals for this visit include:   Chief Complaint   Patient presents with     RECHECK     follow up       She requests these members of her care team be copied on today's visit information: yes    PCP: Nivia Ovalle    Referring Provider:  No referring provider defined for this encounter.    BP 90/60 (BP Location: Left arm, Patient Position: Sitting, Cuff Size: Adult Large)   Pulse 69   Temp 98  F (36.7  C)   Ht 1.626 m (5' 4\")   Wt 88.7 kg (195 lb 8 oz)   LMP 12/05/2012   SpO2 97%   BMI 33.56 kg/m      Do you need any medication refills at today's visit? No  Adonay Wild CMA    "

## 2021-03-25 NOTE — PATIENT INSTRUCTIONS
-please get x-rays and blood tests today  -please follow-up with your primary care doctor regarding lump behind your neck, dizziness and rash as well as nodularity over the back of your neck  -you may need injection of the left hip bursa for pain relief  -I will be in touch once your results are back  -follow-up based on lab results
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
## 2021-03-26 DIAGNOSIS — M25.50 POLYARTHRALGIA: Primary | ICD-10-CM

## 2021-03-26 LAB
ALBUMIN SERPL ELPH-MCNC: 4 G/DL (ref 3.7–5.1)
ALPHA1 GLOB SERPL ELPH-MCNC: 0.3 G/DL (ref 0.2–0.4)
ALPHA2 GLOB SERPL ELPH-MCNC: 0.9 G/DL (ref 0.5–0.9)
ANA PAT SER IF-IMP: ABNORMAL
ANA SER QL IF: POSITIVE
ANA TITR SER IF: ABNORMAL {TITER}
B-GLOBULIN SERPL ELPH-MCNC: 0.9 G/DL (ref 0.6–1)
C3 SERPL-MCNC: NORMAL MG/DL (ref 81–157)
C4 SERPL-MCNC: NORMAL MG/DL (ref 13–39)
CCP AB SER IA-ACNC: 1 U/ML
ENA RNP IGG SER IA-ACNC: <0.2 AI (ref 0–0.9)
ENA SM IGG SER-ACNC: <0.2 AI (ref 0–0.9)
ENA SS-A IGG SER IA-ACNC: <0.2 AI (ref 0–0.9)
ENA SS-B IGG SER IA-ACNC: <0.2 AI (ref 0–0.9)
GAMMA GLOB SERPL ELPH-MCNC: 1 G/DL (ref 0.7–1.6)
HCV AB SERPL QL IA: NONREACTIVE
M PROTEIN SERPL ELPH-MCNC: 0 G/DL
PROT PATTERN SERPL ELPH-IMP: NORMAL
RHEUMATOID FACT SER NEPH-ACNC: <7 IU/ML (ref 0–20)

## 2021-03-27 LAB — DAT POLY-SP REAG RBC QL: NORMAL

## 2021-03-28 LAB — DSDNA AB SER-ACNC: 1 IU/ML

## 2021-03-29 LAB — ENA SCL70 IGG SER IA-ACNC: <0.2 AI (ref 0–0.9)

## 2021-04-02 DIAGNOSIS — M25.50 POLYARTHRALGIA: ICD-10-CM

## 2021-04-02 DIAGNOSIS — M25.579 PAIN IN JOINT INVOLVING ANKLE AND FOOT, UNSPECIFIED LATERALITY: Primary | ICD-10-CM

## 2021-04-02 DIAGNOSIS — R76.8 POSITIVE ANA (ANTINUCLEAR ANTIBODY): ICD-10-CM

## 2021-04-02 NOTE — RESULT ENCOUNTER NOTE
RN, please call the pt/pt's daughter to inform her of lab results which showed +RICKEY 1:160 but negative specific antibodies for lupus, Sjogren's syndrome, mixed connective tissue disease, rheumatoid arthritis. There are a couple of additional tests that I would like to get that I was not able to add on -if she can stop by at some point to get blood test and urine test, that would be great.     X-rays:   Hip x-rays showed mild degenerative findings but no joint fluid. Given negative x-rays, the left hip pain she was experiencing is most likely from trochanteric bursitis as discussed during her visit. I would recommend she sees orthopedics/sports medicine for the L hip pain.   X-rays of ankles showed some degenerative changes but no joint damage or fluid in the joint space.   Her knee x-rays showed osteoarthritis in both knees.     So far, there is no convincing evidence that her pain is from inflammatory arthritis. I would recommend obtaining an MRI of her R foot to follow-up on the findings of prior x-rays. I will place the MRI order.    Thanks!

## 2021-04-05 ENCOUNTER — TELEPHONE (OUTPATIENT)
Dept: RHEUMATOLOGY | Facility: CLINIC | Age: 51
End: 2021-04-05

## 2021-04-05 NOTE — TELEPHONE ENCOUNTER
LVM using interpretive services for pt to call back.       LUDIN Barros, RN   Medical Specialty Care Coordinator   [facility]       ----- Message from Lauren German MD sent at 4/2/2021  5:11 PM CDT -----  RN, please call the pt/pt's daughter to inform her of lab results which showed +RICKEY 1:160 but negative specific antibodies for lupus, Sjogren's syndrome, mixed connective tissue disease, rheumatoid arthritis. There are a couple of additional tests that I would like to get that I was not able to add on -if she can stop by at some point to get blood test and urine test, that would be great.     X-rays:   Hip x-rays showed mild degenerative findings but no joint fluid. Given negative x-rays, the left hip pain she was experiencing is most likely from trochanteric bursitis as discussed during her visit. I would recommend she sees orthopedics/sports medicine for the L hip pain.   X-rays of ankles showed some degenerative changes but no joint damage or fluid in the joint space.   Her knee x-rays showed osteoarthritis in both knees.     So far, there is no convincing evidence that her pain is from inflammatory arthritis. I would recommend obtaining an MRI of her R foot to follow-up on the findings of prior x-rays. I will place the MRI order.    Thanks!

## 2021-04-07 ENCOUNTER — TELEPHONE (OUTPATIENT)
Dept: RHEUMATOLOGY | Facility: CLINIC | Age: 51
End: 2021-04-07

## 2021-04-07 NOTE — TELEPHONE ENCOUNTER
Spoke with patient's daughter and patient below regarding lab results and recommendations. They verbalized understanding. They will contact a [facility] lab close to home to set up an appointment for further blood and urine tests. They were also given imaging results and recommendations. Radiology should contact them to schedule MRI. I told them if they don't hear from anyone by the end of the week to please give our office a call and we can follow up. They are familiar with our clinic number. Patient and daughter were appreciative, they had no further questions at this time.     Patient's daughter would like to be contact to schedule MRI if possible. Her number was updated in the chart, as the one we had listed was old. Correct phone number is [phone].    Mya Du, BSN, RN  Medical Specialty Care Coordinator  [facility]

## 2021-04-07 NOTE — TELEPHONE ENCOUNTER
[facility] Call Center    Phone Message    May a detailed message be left on voicemail: no     Reason for Call: Other: Patient's daughter is calling to get results for xrays and labs. If able to call between 2:00 and 3:00 daughter will be available to interpret.     Action Taken: Message routed to:  Adult Clinics: Rheumatology p [id number]    Travel Screening: Not Applicable

## 2021-04-07 NOTE — TELEPHONE ENCOUNTER
Addressed in additional telephone encounter.     Mya Du, ISABELN, RN  Medical Specialty Care Coordinator  [facility]

## 2021-04-09 DIAGNOSIS — M25.579 PAIN IN JOINT INVOLVING ANKLE AND FOOT, UNSPECIFIED LATERALITY: ICD-10-CM

## 2021-04-09 DIAGNOSIS — R76.8 POSITIVE ANA (ANTINUCLEAR ANTIBODY): ICD-10-CM

## 2021-04-09 DIAGNOSIS — M25.50 POLYARTHRALGIA: ICD-10-CM

## 2021-04-09 LAB
ALBUMIN UR-MCNC: NEGATIVE MG/DL
APPEARANCE UR: CLEAR
BACTERIA #/AREA URNS HPF: ABNORMAL /HPF
BILIRUB UR QL STRIP: NEGATIVE
CK SERPL-CCNC: 140 U/L (ref 30–225)
COLOR UR AUTO: YELLOW
GLUCOSE UR STRIP-MCNC: NEGATIVE MG/DL
HGB UR QL STRIP: ABNORMAL
KETONES UR STRIP-MCNC: NEGATIVE MG/DL
LEUKOCYTE ESTERASE UR QL STRIP: ABNORMAL
NITRATE UR QL: NEGATIVE
NON-SQ EPI CELLS #/AREA URNS LPF: ABNORMAL /LPF
PH UR STRIP: 5.5 PH (ref 5–7)
PROT UR-MCNC: 0.05 G/L
PROT/CREAT 24H UR: 0.05 G/G CR (ref 0–0.2)
RBC #/AREA URNS AUTO: ABNORMAL /HPF
SOURCE: ABNORMAL
SP GR UR STRIP: 1.01 (ref 1–1.03)
UROBILINOGEN UR STRIP-ACNC: 0.2 EU/DL (ref 0.2–1)
WBC #/AREA URNS AUTO: ABNORMAL /HPF

## 2021-04-09 PROCEDURE — 36415 COLL VENOUS BLD VENIPUNCTURE: CPT | Performed by: STUDENT IN AN ORGANIZED HEALTH CARE EDUCATION/TRAINING PROGRAM

## 2021-04-09 PROCEDURE — 82550 ASSAY OF CK (CPK): CPT | Performed by: STUDENT IN AN ORGANIZED HEALTH CARE EDUCATION/TRAINING PROGRAM

## 2021-04-09 PROCEDURE — 84156 ASSAY OF PROTEIN URINE: CPT | Performed by: STUDENT IN AN ORGANIZED HEALTH CARE EDUCATION/TRAINING PROGRAM

## 2021-04-09 PROCEDURE — 86160 COMPLEMENT ANTIGEN: CPT | Performed by: STUDENT IN AN ORGANIZED HEALTH CARE EDUCATION/TRAINING PROGRAM

## 2021-04-09 PROCEDURE — 81001 URINALYSIS AUTO W/SCOPE: CPT | Performed by: STUDENT IN AN ORGANIZED HEALTH CARE EDUCATION/TRAINING PROGRAM

## 2021-04-12 LAB
C3 SERPL-MCNC: 146 MG/DL (ref 81–157)
C4 SERPL-MCNC: 47 MG/DL (ref 13–39)

## 2021-04-12 NOTE — RESULT ENCOUNTER NOTE
Dear Eve,     Your labs showed normal muscle enzyme. I was looking to see if C3 and C4 are low (if low, can be indicative of lupus) -in your case they are not low. Urine without protein but some blood seen without red blood cells. I would recommend follow-up with primary care provider about the blood in the urine. I will wait for the MRI results of the foot.     Please let us know if you have any questions or concerns.    Regards,  Lauren German MD

## 2021-04-14 ENCOUNTER — TELEPHONE (OUTPATIENT)
Dept: RHEUMATOLOGY | Facility: CLINIC | Age: 51
End: 2021-04-14

## 2021-04-14 NOTE — TELEPHONE ENCOUNTER
[facility]    Phone Message    May a detailed message be left on voicemail: yes     Reason for Call: Requesting Results   Name/type of test: lab  Date of test: 4/9/2021  Was test done at a location other than [facility] (Please fill in the location if not [facility])?: No      Action Taken: Message routed to:  Adult Clinics: Rheumatology p [id number]    Travel Screening: Not Applicable

## 2021-04-14 NOTE — TELEPHONE ENCOUNTER
Spoke with patient and her daughter. They did see the result note on MyChart but were unsure how urgent they needed to follow up with PCP regarding blood in the urine. Patient denies any symptoms of a UTI. I did explain that there was only a trace amount seen in her urine. But it's worth mentioning to her PCP for further monitoring and workup if appropriate. They verbalized understanding and were appreciative. I told them that the MRI results will be sent via [employer] as well. No further questions at this time.     Mya Du, BSN, RN  Medical Specialty Care Coordinator  [facility]

## 2021-04-21 ENCOUNTER — E-VISIT (OUTPATIENT)
Dept: URGENT CARE | Facility: URGENT CARE | Age: 51
End: 2021-04-21
Payer: COMMERCIAL

## 2021-04-21 DIAGNOSIS — Z20.822 SUSPECTED COVID-19 VIRUS INFECTION: Primary | ICD-10-CM

## 2021-04-21 DIAGNOSIS — Z20.822 SUSPECTED COVID-19 VIRUS INFECTION: ICD-10-CM

## 2021-04-21 LAB
LABORATORY COMMENT REPORT: NORMAL
SARS-COV-2 RNA RESP QL NAA+PROBE: NEGATIVE
SARS-COV-2 RNA RESP QL NAA+PROBE: NORMAL
SPECIMEN SOURCE: NORMAL
SPECIMEN SOURCE: NORMAL

## 2021-04-21 PROCEDURE — U0003 INFECTIOUS AGENT DETECTION BY NUCLEIC ACID (DNA OR RNA); SEVERE ACUTE RESPIRATORY SYNDROME CORONAVIRUS 2 (SARS-COV-2) (CORONAVIRUS DISEASE [COVID-19]), AMPLIFIED PROBE TECHNIQUE, MAKING USE OF HIGH THROUGHPUT TECHNOLOGIES AS DESCRIBED BY CMS-2020-01-R: HCPCS | Performed by: PHYSICIAN ASSISTANT

## 2021-04-21 PROCEDURE — 99421 OL DIG E/M SVC 5-10 MIN: CPT | Performed by: PHYSICIAN ASSISTANT

## 2021-04-21 PROCEDURE — U0005 INFEC AGEN DETEC AMPLI PROBE: HCPCS | Performed by: PHYSICIAN ASSISTANT

## 2021-04-21 NOTE — PATIENT INSTRUCTIONS
Dear Eve Robison,    Your symptoms show that you may have coronavirus (COVID-19). This illness can cause fever, cough and trouble breathing. Many people get a mild case and get better on their own. Some people can get very sick.    Will I be tested for COVID-19?  We would like to test you for Covid-19 virus. I have placed orders for this test.     To schedule: go to your [employer] home page and scroll down to the section that says  You have an appointment that needs to be scheduled  and click the large green button that says  Schedule Now  and follow the steps to find the next available openings.    If you are unable to complete these [employer] scheduling steps, please call [phone] to schedule your testing.     Return to work/school/ guidance:  Please let your workplace manager and staffing office know when your quarantine ends     We can t give you an exact date as it depends on the above. You can calculate this on your own or work with your manager/staffing office to calculate this. (For example if you were exposed on 10/4, you would have to quarantine for 14 full days. That would be through 10/18. You could return on 10/19.)      If you receive a positive COVID-19 test result, follow the guidance of the those who are giving you the results. Usually the return to work is 10 (or in some cases 20 days from symptom onset.) If you work at [facility], you must also be cleared by Employee Occupational Health and Safety to return to work.        If you receive a negative COVID-19 test result and did not have a high risk exposure to someone with a known positive COVID-19 test, you can return to work once you're free of fever for 24 hours without fever-reducing medication and your symptoms are improving or resolved.      If you receive a negative COVID-19 test and If you had a high risk exposure to someone who has tested positive for COVID-19 then you can return to work 14 days after your last  contact with the positive individual    Note: If you have ongoing exposure to the covid positive person, this quarantine period may be more than 14 days. (For example, if you are continued to be exposed to your child who tested positive and cannot isolate from them, then the quarantine of 7-14 days can't start until your child is no longer contagious. This is typically 10 days from onset of the child's symptoms. So the total duration may be 17-24 days in this case.)    Sign up for [employer].   We know it's scary to hear that you might have COVID-19. We want to track your symptoms to make sure you're okay over the next 2 weeks. Please look for an email from [employer]--this is a free, online program that we'll use to keep in touch. To sign up, follow the link in the email you will receive. Learn more at [email or web address]    How can I take care of myself?    Get lots of rest. Drink extra fluids (unless a doctor has told you not to)    Take Tylenol (acetaminophen) or ibuprofen for fever or pain. If you have liver or kidney problems, ask your family doctor if it's okay to take Tylenol o ibuprofen    If you have other health problems (like cancer, heart failure, an organ transplant or severe kidney disease): Call your specialty clinic if you don't feel better in the next 2 days.    Know when to call 911. Emergency warning signs include:  o Trouble breathing or shortness of breath  o Pain or pressure in the chest that doesn't go away  o Feeling confused like you haven't felt before, or not being able to wake up  o Bluish-colored lips or face    Where can I get more information?  [facility] - About COVID-19:   [email or web address]    CDC - What to Do If You're Sick:   www.cdc.gov/coronavirus/2019-ncov/about/steps-when-sick.html    April 21, 2021  RE:  Eve Robison                                                                                                                   [address]  [id number]  [facility] [address]      To whom it may concern:    I evaluated Eve Robison on April 21, 2021. Eve Robison should be excused from work/school.     They should let their workplace manager and staffing office know when their quarantine ends.    We can not give an exact date as it depends on the information below. They can calculate this on their own or work with their manager/staffing office to calculate this. (For example if they were exposed on 10/04, they would have to quarantine for 14 full days. That would be through 10/18. They could return on 10/19.)    Quarantine Guidelines:      If patient receives a positive COVID-19 test result, they should follow the guidance of those who are giving the results. Usually the return to work is 10 (or in some cases 20 days from symptom onset.) If they work at [employer], they must be cleared by Employee Occupational Health and Safety to return to work.        If patient receives a negative COVID-19 test result and did not have a high risk exposure to someone with a known positive COVID-19 test, they can return to work once they're free of fever for 24 hours without fever-reducing medication and their symptoms are improving or resolved.      If patient receives a negative COVID-19 test and if they had a high risk exposure to someone who has tested positive for COVID-19 then they can return to work 14 days after their last contact with the positive individual    Note: If there is ongoing exposure to the covid positive person, this quarantine period may be longer than 14 days. (For example, if they are continually exposed to their child, who tested positive and cannot isolate from them, then the quarantine of 7-14 days can't start until their child is no longer contagious. This is typically 10 days from onset to the child's symptoms. So the total duration may be 17-24 days in this case.)     Sincerely,  Lenny Lazaro,  DAWSON

## 2021-04-22 ENCOUNTER — TELEPHONE (OUTPATIENT)
Dept: NURSING | Facility: CLINIC | Age: 51
End: 2021-04-22

## 2021-04-22 NOTE — TELEPHONE ENCOUNTER
Informational call:    Daughter is calling about patient. No consent on file. Obtained verbal consent from patient to consent to communicate on file.     Patient is scheduled for second vaccine tomorrow, and is not feeling well. Had negative Covid test yesterday, but is reporting sore throat, headache, runny nose and cough.     Instructed patient to reschedule and call [phone] Covid Scheduling per CDC guidelines to not go to Vaccine center and reschedule when symptoms are resolved.     Instructed patient to stay home and away from others until they have had no fever for 24 hours, symptoms have improved, and at least 10 days have passed since symptoms stated. Patient needs to meet all 3 guidelines per the CDC guidelines.     Roxanne Aquino RN MANate, MAN Nurse Educator 5:35 PM 4/22/2021    COVID 19 Nurse Triage Plan/Patient Instructions    Please be aware that novel coronavirus (COVID-19) may be circulating in the community. If you develop symptoms such as fever, cough, or SOB or if you have concerns about the presence of another infection including coronavirus (COVID-19), please contact your health care provider or visit [email or web address].     Disposition/Instructions    Home care recommended. Follow home care protocol based instructions.    Thank you for taking steps to prevent the spread of this virus.  o Limit your contact with others.  o Wear a simple mask to cover your cough.  o Wash your hands well and often.    Resources    [facility]: About COVID-19: [email or web address]    CDC: What to Do If You're Sick: www.cdc.gov/coronavirus/2019-ncov/about/steps-when-sick.html    CDC: Ending Home Isolation: www.cdc.gov/coronavirus/2019-ncov/hcp/disposition-in-home-patients.html     CDC: Caring for Someone: www.cdc.gov/coronavirus/2019-ncov/if-you-are-sick/care-for-someone.html     [facility]: Interim Guidance for Hospital Discharge to Home:  www.health.[email or web address].[email or web address]/diseases/coronavirus/hcp/hospdischarge.pdf    [facility] clinical trials (COVID-19 research studies): [email or web address]-trials     Below are the COVID-19 hotlines at the [facility] ([facility]). Interpreters are available.   o For health questions: Call [phone] or 1-789.928.3498 (7 a.m. to 7 p.m.)  o For questions about schools and childcare: Call [phone] or 1-455.862.3769 (7 a.m. to 7 p.m.)

## 2021-05-30 ENCOUNTER — HEALTH MAINTENANCE LETTER (OUTPATIENT)
Age: 51
End: 2021-05-30

## 2021-09-19 ENCOUNTER — HEALTH MAINTENANCE LETTER (OUTPATIENT)
Age: 51
End: 2021-09-19

## 2022-06-26 ENCOUNTER — HEALTH MAINTENANCE LETTER (OUTPATIENT)
Age: 52
End: 2022-06-26

## 2022-11-20 ENCOUNTER — HEALTH MAINTENANCE LETTER (OUTPATIENT)
Age: 52
End: 2022-11-20

## 2023-01-28 ENCOUNTER — HOSPITAL ENCOUNTER (EMERGENCY)
Facility: CLINIC | Age: 53
Discharge: HOME | End: 2023-01-29
Attending: EMERGENCY MEDICINE | Admitting: EMERGENCY MEDICINE
Payer: COMMERCIAL

## 2023-01-28 VITALS
RESPIRATION RATE: 16 BRPM | WEIGHT: 180 LBS | TEMPERATURE: 97 F | OXYGEN SATURATION: 100 % | SYSTOLIC BLOOD PRESSURE: 126 MMHG | HEIGHT: 64 IN | HEART RATE: 69 BPM | DIASTOLIC BLOOD PRESSURE: 42 MMHG | BODY MASS INDEX: 30.73 KG/M2

## 2023-01-28 DIAGNOSIS — R09.A2 SENSATION OF FOREIGN BODY IN ESOPHAGUS: ICD-10-CM

## 2023-01-28 PROCEDURE — 99283 EMERGENCY DEPT VISIT LOW MDM: CPT

## 2023-01-29 ENCOUNTER — APPOINTMENT (OUTPATIENT)
Dept: GENERAL RADIOLOGY | Facility: CLINIC | Age: 53
End: 2023-01-29
Attending: EMERGENCY MEDICINE
Payer: COMMERCIAL

## 2023-01-29 PROCEDURE — 70360 X-RAY EXAM OF NECK: CPT

## 2023-01-29 PROCEDURE — 250N000013 HC RX MED GY IP 250 OP 250 PS 637: Performed by: EMERGENCY MEDICINE

## 2023-01-29 RX ORDER — ACETAMINOPHEN 325 MG/1
650 TABLET ORAL ONCE
Status: COMPLETED | OUTPATIENT
Start: 2023-01-29 | End: 2023-01-29

## 2023-01-29 RX ADMIN — ACETAMINOPHEN 650 MG: 325 TABLET, FILM COATED ORAL at 01:02

## 2023-01-29 ASSESSMENT — ENCOUNTER SYMPTOMS
SORE THROAT: 1
SHORTNESS OF BREATH: 0
TROUBLE SWALLOWING: 1

## 2023-01-29 ASSESSMENT — ACTIVITIES OF DAILY LIVING (ADL): ADLS_ACUITY_SCORE: 35

## 2023-01-29 NOTE — ED PROVIDER NOTES
"  History   Chief Complaint:  Swallowed Foreign Body     The history is provided by a relative and the patient. The history is limited by a language barrier. A  was used (Son).      Eve Robison is a 53 year old female who presents after swallowing a foreign body. Patient's son reports that at around 1800 tonight the patient swallowed a small sharp piece of goat bone with her meal. She is experiencing pain in her throat and difficulty swallowing. Patient was able to tolerate bread and tea following the ingestion. No trouble breathing.     Independent Historian:   Son    Review of External Notes:     ROS:  Review of Systems   HENT: Positive for sore throat and trouble swallowing.    Respiratory: Negative for shortness of breath.    All other systems reviewed and are negative.    Allergies:  Asa [Aspirin]  Aspirin  Heparin     Medications:    Benzonatate   Omeprazole   Loratadine  Flonase  Gabapentin  Loperamide  Simethicone   Duloxetine   Naproxen      Past Medical History:     Cervical degenerative disc disease  GERD  Migraine  Vitamin D deficiency   Chronic pain syndrome  PTSD  Pelvic floor dysfunction  Uterine fibroid  Hemorrhoids     Past Surgical History:    Appendectomy      Family History:    Mother - blood cancer    Social History:  Presents with son  PCP: Nivia Ovalle     Physical Exam     Patient Vitals for the past 24 hrs:   BP Temp Temp src Pulse Resp SpO2 Height Weight   01/28/23 2348 -- -- -- -- -- 100 % -- --   01/28/23 2343 126/42 97  F (36.1  C) Temporal 69 16 -- 1.626 m (5' 4\") 81.6 kg (180 lb)        Physical Exam  Physical Exam   General:  Sitting on bed with son at bedside, comfortable appearing.   HENT:  No obvious trauma to head. Posterior oropharynx without erythema, uvula is midline and no soft palate petechiae. No swelling beneath tongue. With tongue depressor, able to visualize epiglottis, no foreign body appreciated, no swelling or erythema to " epiglottis.  Right Ear:  External ear normal.   Left Ear:  External ear normal.   Nose:  Nose normal.   Eyes:  Conjunctivae and EOM are normal.  Neck: Normal range of motion. Neck supple. No tracheal deviation present.   Pulm/Chest: No respiratory distress  M/S: Normal range of motion.   Neuro: Alert. GCS 15.  Skin: Skin is warm and dry. No rash noted. Not diaphoretic.   Psych: Normal mood and affect. Behavior is normal.     Emergency Department Course   Imaging:  Neck soft tissue XR   Preliminary Result   IMPRESSION: No prevertebral edema. Normal appearance of the epiglottis and larynx. No airway compromise. No definite radiopaque foreign body identified. If there is continued clinical concern for a retained foreign body, consider direct inspection.      Multilevel degenerative changes of the cervical spine.        Emergency Department Course & Assessments:     Interventions:  Medications   acetaminophen (TYLENOL) tablet 650 mg (has no administration in time range)      Independent Interpretation (X-rays, CTs, rhythm strip):  None     Social Determinants of Health affecting care:  None    Assessments:  0010 I obtained history and examined the patient, as above.  0023 I rechecked the patient and updated them on plan for xray. Son informed me that patient has shrapnel in the back of her neck, so I adjusted xray order and specified it.  0054 I rechecked and updated patient and son on xray findings. Amenable to discharge.    Disposition:  The patient was discharged to home.     Impression & Plan    Medical Decision Making:  Eve Robison is a very pleasant 53 year old year old patient who presents to the emergency department with concern of pain after swallowing a goat bone with discomfort in the posterior oropharynx/upper neck.  This occurred a few hours ago.  She has since been able to drink water/tea and eat bread without concern.  She denies any aspiration and has not had a cough or shortness of breath.   Physical examination shows nothing in the posterior oropharynx.  With the tongue depressor I can clearly see her epiglottis and appreciate no foreign body in the vallecula nor any erythema or swelling to suggest epiglottitis.  The sensation she has of the foreign body is in the mid to upper neck.  An x-ray was obtained and clearly shows no goat bone.  I discussed the very rare chance that there could be a goat bone not clearly seen on x-ray.  I discussed the risk and benefit of calling and GI to do an emergent EGD at this time.  Given the x-ray was negative, she has been able to eat and drink, I have a much higher suspicion that this represents an esophageal abrasion.  After reviewing this with the patient and son they are in agreement that that is more likely, but nonetheless I did recommend that they return with any persistent or worsening pain or inability to eat food.  I discussed at that time an EGD could then be considered.  I did provide him the phone number for a gastroenterologist to follow-up with tomorrow if the sensation is still present for an outpatient EGD.    The treatment plan was discussed with the patient and they expressed understanding of this plan and consented to the plan.  In addition, the patient will return to the emergency department if their symptoms persist, worsen, if new symptoms arise or if there is any concern as other pathology may be present that is not evident at this time. They also understand the importance of close follow up in the clinic and if unable to do so will return to the emergency department for a reevaluation. All questions were answered.    Diagnosis:    ICD-10-CM    1. Sensation of foreign body in esophagus  R19.8          Scribe Disclosure:  Trudy AGUAYO, am serving as a scribe at 12:05 AM on 1/29/2023 to document services personally performed by Lenny Dixon DO based on my observations and the provider's statements to me.     1/28/2023   Zack  DO Zack Arora, Lenny Goodwin DO  01/29/23 0105

## 2023-01-29 NOTE — ED TRIAGE NOTES
Pt eating dinner tonight, feels like she swallowed a piece of bone that got stuck.  Happened around 6pm.  No respiratory distress.     Triage Assessment     Row Name 01/28/23 7503       Triage Assessment (Adult)    Airway WDL WDL       Respiratory WDL    Respiratory WDL WDL       Skin Circulation/Temperature WDL    Skin Circulation/Temperature WDL WDL       Cardiac WDL    Cardiac WDL WDL       Peripheral/Neurovascular WDL    Peripheral Neurovascular WDL WDL       Cognitive/Neuro/Behavioral WDL    Cognitive/Neuro/Behavioral WDL WDL

## 2023-05-15 ENCOUNTER — HOSPITAL ENCOUNTER (EMERGENCY)
Facility: CLINIC | Age: 53
Discharge: HOME | End: 2023-05-15
Attending: PHYSICIAN ASSISTANT | Admitting: PHYSICIAN ASSISTANT
Payer: COMMERCIAL

## 2023-05-15 VITALS
HEIGHT: 63 IN | SYSTOLIC BLOOD PRESSURE: 120 MMHG | HEART RATE: 96 BPM | TEMPERATURE: 98.9 F | RESPIRATION RATE: 15 BRPM | DIASTOLIC BLOOD PRESSURE: 66 MMHG | WEIGHT: 185 LBS | OXYGEN SATURATION: 98 % | BODY MASS INDEX: 32.78 KG/M2

## 2023-05-15 DIAGNOSIS — R11.10 VOMITING AND DIARRHEA: ICD-10-CM

## 2023-05-15 DIAGNOSIS — A49.1 STREPTOCOCCAL INFECTION: ICD-10-CM

## 2023-05-15 DIAGNOSIS — R19.7 VOMITING AND DIARRHEA: ICD-10-CM

## 2023-05-15 LAB
ANION GAP SERPL CALCULATED.3IONS-SCNC: 15 MMOL/L (ref 7–15)
BASOPHILS # BLD AUTO: 0 10E3/UL (ref 0–0.2)
BASOPHILS NFR BLD AUTO: 0 %
BUN SERPL-MCNC: 12.2 MG/DL (ref 6–20)
CALCIUM SERPL-MCNC: 9.5 MG/DL (ref 8.6–10)
CHLORIDE SERPL-SCNC: 100 MMOL/L (ref 98–107)
CREAT SERPL-MCNC: 0.71 MG/DL (ref 0.51–0.95)
DEPRECATED HCO3 PLAS-SCNC: 25 MMOL/L (ref 22–29)
EOSINOPHIL # BLD AUTO: 0 10E3/UL (ref 0–0.7)
EOSINOPHIL NFR BLD AUTO: 0 %
ERYTHROCYTE [DISTWIDTH] IN BLOOD BY AUTOMATED COUNT: 13.6 % (ref 10–15)
GFR SERPL CREATININE-BSD FRML MDRD: >90 ML/MIN/1.73M2
GLUCOSE SERPL-MCNC: 114 MG/DL (ref 70–99)
HCT VFR BLD AUTO: 42.4 % (ref 35–47)
HGB BLD-MCNC: 13.4 G/DL (ref 11.7–15.7)
IMM GRANULOCYTES # BLD: 0.1 10E3/UL
IMM GRANULOCYTES NFR BLD: 1 %
LYMPHOCYTES # BLD AUTO: 0.5 10E3/UL (ref 0.8–5.3)
LYMPHOCYTES NFR BLD AUTO: 3 %
MCH RBC QN AUTO: 26 PG (ref 26.5–33)
MCHC RBC AUTO-ENTMCNC: 31.6 G/DL (ref 31.5–36.5)
MCV RBC AUTO: 82 FL (ref 78–100)
MONOCYTES # BLD AUTO: 0.9 10E3/UL (ref 0–1.3)
MONOCYTES NFR BLD AUTO: 6 %
NEUTROPHILS # BLD AUTO: 13.8 10E3/UL (ref 1.6–8.3)
NEUTROPHILS NFR BLD AUTO: 90 %
NRBC # BLD AUTO: 0 10E3/UL
NRBC BLD AUTO-RTO: 0 /100
PLATELET # BLD AUTO: 292 10E3/UL (ref 150–450)
POTASSIUM SERPL-SCNC: 4.6 MMOL/L (ref 3.4–5.3)
RBC # BLD AUTO: 5.16 10E6/UL (ref 3.8–5.2)
SODIUM SERPL-SCNC: 140 MMOL/L (ref 136–145)
WBC # BLD AUTO: 15.3 10E3/UL (ref 4–11)

## 2023-05-15 PROCEDURE — 99284 EMERGENCY DEPT VISIT MOD MDM: CPT | Mod: 25

## 2023-05-15 PROCEDURE — 250N000013 HC RX MED GY IP 250 OP 250 PS 637: Performed by: PHYSICIAN ASSISTANT

## 2023-05-15 PROCEDURE — 96374 THER/PROPH/DIAG INJ IV PUSH: CPT

## 2023-05-15 PROCEDURE — 80048 BASIC METABOLIC PNL TOTAL CA: CPT | Performed by: EMERGENCY MEDICINE

## 2023-05-15 PROCEDURE — 250N000011 HC RX IP 250 OP 636: Performed by: PHYSICIAN ASSISTANT

## 2023-05-15 PROCEDURE — 250N000011 HC RX IP 250 OP 636: Performed by: EMERGENCY MEDICINE

## 2023-05-15 PROCEDURE — 85025 COMPLETE CBC W/AUTO DIFF WBC: CPT | Performed by: EMERGENCY MEDICINE

## 2023-05-15 PROCEDURE — 36415 COLL VENOUS BLD VENIPUNCTURE: CPT | Performed by: EMERGENCY MEDICINE

## 2023-05-15 PROCEDURE — 96375 TX/PRO/DX INJ NEW DRUG ADDON: CPT

## 2023-05-15 RX ORDER — CEPHALEXIN 500 MG/1
500 CAPSULE ORAL ONCE
Status: COMPLETED | OUTPATIENT
Start: 2023-05-15 | End: 2023-05-15

## 2023-05-15 RX ORDER — ACETAMINOPHEN 500 MG
1000 TABLET ORAL ONCE
Status: COMPLETED | OUTPATIENT
Start: 2023-05-15 | End: 2023-05-15

## 2023-05-15 RX ORDER — ONDANSETRON 2 MG/ML
4 INJECTION INTRAMUSCULAR; INTRAVENOUS ONCE
Status: COMPLETED | OUTPATIENT
Start: 2023-05-15 | End: 2023-05-15

## 2023-05-15 RX ORDER — CEPHALEXIN 500 MG/1
500 CAPSULE ORAL 2 TIMES DAILY
Qty: 20 CAPSULE | Refills: 0 | Status: SHIPPED | OUTPATIENT
Start: 2023-05-15 | End: 2023-05-25

## 2023-05-15 RX ORDER — KETOROLAC TROMETHAMINE 15 MG/ML
15 INJECTION, SOLUTION INTRAMUSCULAR; INTRAVENOUS ONCE
Status: COMPLETED | OUTPATIENT
Start: 2023-05-15 | End: 2023-05-15

## 2023-05-15 RX ORDER — ONDANSETRON 4 MG/1
4 TABLET, ORALLY DISINTEGRATING ORAL EVERY 8 HOURS PRN
Qty: 12 TABLET | Refills: 0 | Status: SHIPPED | OUTPATIENT
Start: 2023-05-15 | End: 2023-05-19

## 2023-05-15 RX ADMIN — ACETAMINOPHEN 1000 MG: 500 TABLET ORAL at 20:52

## 2023-05-15 RX ADMIN — KETOROLAC TROMETHAMINE 15 MG: 15 INJECTION, SOLUTION INTRAMUSCULAR; INTRAVENOUS at 16:26

## 2023-05-15 RX ADMIN — ONDANSETRON 4 MG: 2 INJECTION INTRAMUSCULAR; INTRAVENOUS at 20:51

## 2023-05-15 RX ADMIN — CEPHALEXIN 500 MG: 500 CAPSULE ORAL at 20:59

## 2023-05-15 ASSESSMENT — ENCOUNTER SYMPTOMS
DIARRHEA: 1
BLOOD IN STOOL: 0
TROUBLE SWALLOWING: 0
COUGH: 0
FEVER: 0
VOMITING: 1
ABDOMINAL PAIN: 0
SORE THROAT: 1
NAUSEA: 1

## 2023-05-15 ASSESSMENT — ACTIVITIES OF DAILY LIVING (ADL): ADLS_ACUITY_SCORE: 35

## 2023-05-15 NOTE — ED TRIAGE NOTES
Been on antibiotics for strep infection since having nausea vomiting diarrhea.     Triage Assessment     Row Name 05/15/23 6950       Triage Assessment (Adult)    Airway WDL WDL       Respiratory WDL    Respiratory WDL WDL       Skin Circulation/Temperature WDL    Skin Circulation/Temperature WDL WDL       Cardiac WDL    Cardiac WDL WDL       Peripheral/Neurovascular WDL    Peripheral Neurovascular WDL WDL       Cognitive/Neuro/Behavioral WDL    Cognitive/Neuro/Behavioral WDL WDL

## 2023-05-15 NOTE — ED NOTES
Rapid Assessment Note    History:   Eve Robison is a 53 year old female who presents with pharyngitis. The patient had chills and a sore throat yesterday, and was seen in the ED at [facility]. She was diagnosed with strep. She took amoxicillin today. She has continued chills, along with body aches, vomiting, diarrhea, and a cough.     Exam:   General:  Alert, interactive.  Uncomfortable appearing  HEENT: Erythema the posterior oropharynx no obvious abscess  Cardiovascular:  Well perfused  Lungs:  No respiratory distress, no accessory muscle use  Neuro:  Moving all 4 extremities  Skin:  Warm, dry  Psych:  Normal affect    Plan of Care:   I evaluated the patient and developed an initial plan of care. I discussed this plan and explained that I, or one of my partners, would be returning to complete the evaluation.       I, Conchis Hired, am serving as a scribe to document services personally performed by Everton Gilbert MD based on my observations and the provider's statements to me.    5/15/2023  [employer]    Portions of this medical record were completed by a scribe. UPON MY REVIEW AND AUTHENTICATION BY ELECTRONIC SIGNATURE, this confirms (a) I performed the applicable clinical services, and (b) the record is accurate.        Everton Gilbert MD  05/15/23 8192

## 2023-05-16 NOTE — ED PROVIDER NOTES
History     Chief Complaint:  Pharyngitis and Nausea, Vomiting, & Diarrhea     HPI   Eve Robison is a 53 year old female  that presents with a cute onset of nausea vomiting diarrhea in the setting of recent Augmentin use this morning.  She was diagnosed with strep yesterday.  She had a CT scan of her neck showing no fluid collection or peritonsillar abscess.  Patient reports that she was given Toradol earlier in triage and now her sore throat is back.  She denies any bloody diarrhea or abdominal pain.  No fever today.  She has been able to keep Gatorade down.  She reports the vomiting, diarrhea started after she took Augmentin which she has never had before.  She is accompanied by her son today. Son helps translate.    LOCATION: [facility]  DATE/TIME: 5/15/2023 1:04 AM CDT    INDICATION: Soft tissue swelling, infection suspected, neck xray done  COMPARISON: Plain radiographs 01/29/2023  CONTRAST: IOPAMIDOL 61 % IV SOLN 100 mL  TECHNIQUE: Routine CT Soft Tissue Neck with IV contrast. Multiplanar reformats. Dose reduction techniques were used.    FINDINGS:     MUCOSAL SPACES/SOFT TISSUES: Mild asymmetry with prominence of the right oropharyngeal mucosa (series 2 image 34). This is nonspecific, could be due to an inflammatory/infectious as well as neoplastic process. No definite discrete lesion identified. Direct visualization recommended. Normal vocal cords and infraglottic trachea. Normal parapharyngeal space and subcutaneous soft tissues. Normal oral cavity,  spaces, and floor of mouth structures.    LYMPH NODES: Several lymph nodes at upper limits of normal throughout neck bilaterally, nonspecific.     SALIVARY GLANDS: Normal parotid and submandibular glands.    THYROID: Normal.     VESSELS: Vascular structures of the neck are patent.    VISUALIZED INTRACRANIAL/ORBITS/SINUSES: No abnormality of the visualized intracranial compartment or orbits. Visualized paranasal sinuses and mastoid  air cells are clear.    OTHER: No destructive osseous lesion. The included lung apices are clear.    IMPRESSION:   1.  Mild asymmetry with prominence of the right oropharyngeal mucosa. This is nonspecific, could be due to an inflammatory/infectious as well as neoplastic process. No definite discrete lesion identified. Direct visualization recommended.  2.  Several lymph nodes at upper limits of normal throughout neck bilaterally, nonspecific.  3.  Remaining soft tissues of the neck appear normal.    Independent Historian:   None - Patient Only    Review of External Notes:     ED note 5/14/23- diagnosed with strep, treated with Augmentin    ROS:  Review of Systems   Constitutional: Negative for fever.   HENT: Positive for sore throat. Negative for trouble swallowing.    Respiratory: Negative for cough.    Gastrointestinal: Positive for diarrhea, nausea and vomiting. Negative for abdominal pain and blood in stool.   Skin: Negative for rash.   All other systems reviewed and are negative.     Per HPI.    Allergies:  Asa [Aspirin]  Aspirin  Heparin  Heparin (Porcine)  Lmw Heparin     Medications:    cephALEXin (KEFLEX) 500 MG capsule  ondansetron (ZOFRAN ODT) 4 MG ODT tab  Cholecalciferol (VITAMIN D3 PO)  cyclobenzaprine (FLEXERIL) 10 MG tablet  DULoxetine (CYMBALTA) 20 MG capsule  gabapentin (NEURONTIN) 300 MG capsule  ibuprofen (ADVIL,MOTRIN) 600 MG tablet  IBUPROFEN PO  Naproxen (NAPROSYN PO)  OMEPRAZOLE PO  OXYBUTYNIN CHLORIDE PO  oxyCODONE-acetaminophen (PERCOCET) 5-325 MG per tablet  VITAMIN D, CHOLECALCIFEROL, PO        Past Medical History:    Past Medical History:   Diagnosis Date     Cervicalgia      DDD (degenerative disc disease), cervical      Dry eyes, bilateral 12/5/2012     GERD (gastroesophageal reflux disease) 12/5/2012     LBP (low back pain)      Left shoulder pain      Migraine headache      Vitamin D deficiency        Past Surgical History:    Past Surgical History:   Procedure Laterality Date      "APPENDECTOMY  2007     APPENDECTOMY          Family History:    family history includes Blood Disease in her daughter and mother; Cancer in her maternal grandmother; Gynecology in an other family member; Hypertension in her mother.    Social History:  PCP: Nivia Ovalle   Presents with son    Physical Exam     Patient Vitals for the past 24 hrs:   BP Temp Temp src Pulse Resp SpO2 Height Weight   05/15/23 2050 120/66 98.9  F (37.2  C) Oral 96 15 98 % -- --   05/15/23 1618 103/64 97.5  F (36.4  C) Temporal 88 18 95 % 1.6 m (5' 3\") 83.9 kg (185 lb)        Physical Exam  Vitals and nursing note reviewed.   Constitutional:       Appearance: She is not diaphoretic.   HENT:      Head: No right periorbital erythema or left periorbital erythema.      Jaw: No trismus or tenderness.      Right Ear: Tympanic membrane and ear canal normal. No drainage or swelling.      Left Ear: Tympanic membrane and ear canal normal. No drainage or swelling.      Mouth/Throat:      Mouth: Mucous membranes are moist.      Pharynx: Oropharynx is clear. Uvula midline. Posterior oropharyngeal erythema present. No pharyngeal swelling or uvula swelling.      Tonsils: No tonsillar exudate or tonsillar abscesses.   Eyes:      Conjunctiva/sclera: Conjunctivae normal.   Cardiovascular:      Rate and Rhythm: Normal rate and regular rhythm.      Heart sounds: Normal heart sounds. No murmur heard.     No friction rub. No gallop.   Pulmonary:      Effort: Pulmonary effort is normal. No respiratory distress.      Breath sounds: Normal breath sounds. No stridor. No wheezing, rhonchi or rales.   Abdominal:      General: There is no distension.      Palpations: Abdomen is soft. There is no mass.      Tenderness: There is no abdominal tenderness. There is no guarding or rebound.   Musculoskeletal:      Cervical back: Neck supple. No erythema. No pain with movement. Normal range of motion.   Lymphadenopathy:      Cervical: Cervical adenopathy present.      " Right cervical: Superficial cervical adenopathy present.      Left cervical: Superficial cervical adenopathy present.   Skin:     General: Skin is warm.   Neurological:      Mental Status: She is alert.   Psychiatric:         Mood and Affect: Mood normal.         Behavior: Behavior normal.         Emergency Department Course     Imaging:  No orders to display      Laboratory:  Labs Ordered and Resulted from Time of ED Arrival to Time of ED Departure   BASIC METABOLIC PANEL - Abnormal       Result Value    Sodium 140      Potassium 4.6      Chloride 100      Carbon Dioxide (CO2) 25      Anion Gap 15      Urea Nitrogen 12.2      Creatinine 0.71      Calcium 9.5      Glucose 114 (*)     GFR Estimate >90     CBC WITH PLATELETS AND DIFFERENTIAL - Abnormal    WBC Count 15.3 (*)     RBC Count 5.16      Hemoglobin 13.4      Hematocrit 42.4      MCV 82      MCH 26.0 (*)     MCHC 31.6      RDW 13.6      Platelet Count 292      % Neutrophils 90      % Lymphocytes 3      % Monocytes 6      % Eosinophils 0      % Basophils 0      % Immature Granulocytes 1      NRBCs per 100 WBC 0      Absolute Neutrophils 13.8 (*)     Absolute Lymphocytes 0.5 (*)     Absolute Monocytes 0.9      Absolute Eosinophils 0.0      Absolute Basophils 0.0      Absolute Immature Granulocytes 0.1      Absolute NRBCs 0.0          Procedures     Emergency Department Course & Assessments:         Interventions:  Medications   ketorolac (TORADOL) injection 15 mg (15 mg Intravenous $Given 5/15/23 1626)   ondansetron (ZOFRAN) injection 4 mg (4 mg Intravenous $Given 5/15/23 2051)   acetaminophen (TYLENOL) tablet 1,000 mg (1,000 mg Oral $Given 5/15/23 2052)   cephALEXin (KEFLEX) capsule 500 mg (500 mg Oral $Given 5/15/23 2059)        Independent Interpretation (X-rays, CTs, rhythm strip):  None    Consultations/Discussion of Management or Tests:  None        Social Determinants of Health affecting care:   None    Disposition:  The patient was discharged to home.      Impression & Plan    CMS Diagnoses: None    Medical Decision Making:    Eve Robison is a 53 year old female who presents with acute nausea, vomiting and diarrhea. She has a benign abdominal exam without focal RLQ or LLQ tenderness to suggest diverticulitis  respectively, or other acute abdominal process.  I did discuss that this is likely a result of the Augmentin medication.  As this is a well-known that Augmentin can cause GI side effects.  She never had this prescription before.  We will switch her to Keflex to cover for strep.  Note her white blood cell count has increased from 9 yesterday to 15 today.  I think likely due to vomiting.  Her physical exam findings of her throat I do not feel she requires any further CT imaging or repeat imaging.  She is able to tolerate oral intake and pain is improved with interventions.  We will provide her with antiemetics and she will given her first dose of Keflex here in the ED.     There has been no hematemesis or BRBPR/melena.  Vital signs have remained normal and stable throughout the ED course, and the abdominal re-exam remains benign. I believe she is safe for discharge in improved condition at this time with strict return precautions for recurrent vomiting, pain, fever or any other concerning symptoms.        Diagnosis:    ICD-10-CM    1. Streptococcal infection  A49.1       2. Vomiting and diarrhea  R11.10     R19.7            Discharge Medications:  Discharge Medication List as of 5/15/2023  9:14 PM      START taking these medications    Details   cephALEXin (KEFLEX) 500 MG capsule Take 1 capsule (500 mg) by mouth 2 times daily for 10 days, Disp-20 capsule, R-0, Local Print      ondansetron (ZOFRAN ODT) 4 MG ODT tab Take 1 tablet (4 mg) by mouth every 8 hours as needed for nausea, Disp-12 tablet, R-0, Local Print              5/15/2023   Evert Boyer PA-C Kruger, Jacob C, PA-C  05/15/23 5080

## 2023-07-08 ENCOUNTER — HEALTH MAINTENANCE LETTER (OUTPATIENT)
Age: 53
End: 2023-07-08

## 2024-08-31 ENCOUNTER — HEALTH MAINTENANCE LETTER (OUTPATIENT)
Age: 54
End: 2024-08-31

## 2025-07-12 ENCOUNTER — HEALTH MAINTENANCE LETTER (OUTPATIENT)
Age: 55
End: 2025-07-12